# Patient Record
Sex: FEMALE | Race: WHITE | NOT HISPANIC OR LATINO | Employment: PART TIME | URBAN - METROPOLITAN AREA
[De-identification: names, ages, dates, MRNs, and addresses within clinical notes are randomized per-mention and may not be internally consistent; named-entity substitution may affect disease eponyms.]

---

## 2017-02-15 ENCOUNTER — OFFICE VISIT (OUTPATIENT)
Dept: MEDICAL GROUP | Facility: MEDICAL CENTER | Age: 37
End: 2017-02-15
Payer: MEDICARE

## 2017-02-15 VITALS
HEART RATE: 85 BPM | WEIGHT: 171.8 LBS | HEIGHT: 66 IN | DIASTOLIC BLOOD PRESSURE: 74 MMHG | SYSTOLIC BLOOD PRESSURE: 114 MMHG | BODY MASS INDEX: 27.61 KG/M2 | OXYGEN SATURATION: 98 % | TEMPERATURE: 98 F

## 2017-02-15 DIAGNOSIS — Z13.21 ENCOUNTER FOR VITAMIN DEFICIENCY SCREENING: ICD-10-CM

## 2017-02-15 DIAGNOSIS — C43.9 MALIGNANT MELANOMA, UNSPECIFIED SITE (HCC): ICD-10-CM

## 2017-02-15 DIAGNOSIS — F32.9 MAJOR DEPRESSIVE DISORDER WITH SINGLE EPISODE, REMISSION STATUS UNSPECIFIED: ICD-10-CM

## 2017-02-15 DIAGNOSIS — E89.0 POSTOPERATIVE HYPOTHYROIDISM: ICD-10-CM

## 2017-02-15 DIAGNOSIS — I89.0 ACQUIRED LYMPHEDEMA OF LEG: ICD-10-CM

## 2017-02-15 DIAGNOSIS — F41.9 ANXIETY: ICD-10-CM

## 2017-02-15 DIAGNOSIS — Z13.228 SCREENING FOR METABOLIC DISORDER: ICD-10-CM

## 2017-02-15 PROCEDURE — 1036F TOBACCO NON-USER: CPT | Performed by: NURSE PRACTITIONER

## 2017-02-15 PROCEDURE — 99204 OFFICE O/P NEW MOD 45 MIN: CPT | Mod: 25 | Performed by: NURSE PRACTITIONER

## 2017-02-15 PROCEDURE — G8484 FLU IMMUNIZE NO ADMIN: HCPCS | Performed by: NURSE PRACTITIONER

## 2017-02-15 PROCEDURE — G8419 CALC BMI OUT NRM PARAM NOF/U: HCPCS | Performed by: NURSE PRACTITIONER

## 2017-02-15 NOTE — PROGRESS NOTES
Chief Complaint   Patient presents with   • Lymphedema Aquired     in R. thigh, referral to specialist     Vanda Diego is a 36 y.o. female here to establish care and for evaluation and management of:    HPI:    Melanoma (CMS-HCC)  Primary site was right ant thigh-august of 2014  Had removal of regional lymph nodes  Sees Dermatology q 3 months Phil Mak MD  Sees oncologist q 6 months q6 PET scan    Postoperative hypothyroidism  Stable. Currently taking levothyroxine 150 mcg daily as prescribed.  Denies palpitations, skin changes, temperature intolerance, or changes in bowel habits        Anxiety  Chronic and takes 20mg lexapro daily  Would like to go to counseling for additional support    Depression  Depression for many years and started in around 2007  Would like to go to counseling for additional support  She is taking medicine daily as directed. Side effects: Dry mouth.  Mood currently does not affect: work, relationships, social activities.  Denies agoraphobia, panic attacks, SI/HI. (Denies wishing to be dead, thinking about death, intent to commit suicide, previous suicide attempt)     Review Of Systems  No visual or auditory hallucinations. Denies symptoms of darlene: grandiosity, euphoria, need for little or no sleep, rapid pressured speech, spending sprees, reckless or risky behavior, hypersexual behavior.         Current medicines (including changes today)  Current Outpatient Prescriptions   Medication Sig Dispense Refill   • escitalopram (LEXAPRO) 20 MG tablet Take 20 mg by mouth every day.     • levothyroxine (SYNTHROID) 150 MCG Tab Take 150 mcg by mouth Every morning on an empty stomach.       No current facility-administered medications for this visit.     She  has a past medical history of Cancer (CMS-HCC); Anxiety; Hypothyroid; and Depression.  She  has past surgical history that includes thyroidectomy total and wide excision melanoma, leg, w/poss.stsg.  Social History   Substance Use Topics   • Smoking  "status: Former Smoker -- 0.25 packs/day for 13 years   • Smokeless tobacco: Former User   • Alcohol Use: No     Social History     Social History Narrative     Family History   Problem Relation Age of Onset   • Cancer Father      Squamous cell carcinoma   • Cancer Paternal Grandmother      Squamous cell carcinoma   • Cancer Paternal Grandfather      Squamous cell carcinoma     No family status information on file.         ROS  Constitutional: Negative for fever, chills/sweats   Respiratory: Negative for shortness of breath, DEXTER  Cardiovascular: Negative for chest pain or pressure  GI/: Negative for diarrhea/constipation / urinary difficulty  All other systems reviewed and are negative except as per HPI.        Objective:     Blood pressure 114/74, pulse 85, temperature 36.7 °C (98 °F), height 1.676 m (5' 5.98\"), weight 77.928 kg (171 lb 12.8 oz), SpO2 98 %. Body mass index is 27.74 kg/(m^2).  Physical Exam:    Constitutional: Alert, no distress.  Skin: Warm, dry, good turgor, no rashes in visible areas.  Eye: Equal, round and reactive, conjunctiva clear, lids normal.  ENMT: Lips without lesions, good dentition, oropharynx clear.  Neck: Trachea midline, no masses, no thyromegaly.  Respiratory: Unlabored respiratory effort, lungs clear to auscultation, no wheezes, no ronchi.  Cardiovascular: Normal S1, S2, no murmur, no edema.  Abdomen: Soft, non-tender, no masses, no hepatosplenomegaly.  Psych: Alert and oriented x3, normal affect and mood.        Assessment and Plan:   The following treatment plan was discussed   1. Major depressive disorder with single episode, remission status unspecified  REFERRAL TO BEHAVIORAL HEALTH    referral to counseling and continue meds as prescribed-f/u in 3 months for reassessment   2. Malignant melanoma, unspecified site (CMS-HCC)      continue to follow-up with specialist as described   3. Acquired lymphedema of leg  REFERRAL TO OTHER    referral to lymphedema clinic   4. " Postoperative hypothyroidism      chronic and stable. will request lab records. pt states labs done 5 months ago   5. Anxiety      referral to counseling placed. continue to meds as described   6. Encounter for vitamin deficiency screening  VITAMIN D,25 HYDROXY   7. Screening for metabolic disorder  COMP METABOLIC PANEL     Records requested and will request lab records.   Followup: Return in about 3 months (around 5/15/2017). or sooner for new symptoms or should new problems arise        This dictation was created using voice recognition software. The accuracy of the dictation is limited to the abilities of the software. I expect there may be some errors of grammar and possibly content. The MA notes were reviewed and certain aspects of this information were incorporated into this note.

## 2017-02-15 NOTE — ASSESSMENT & PLAN NOTE
Depression for many years and started in around 2007  Would like to go to counseling for additional support  She is taking medicine daily as directed. Side effects: Dry mouth.  Mood currently does not affect: work, relationships, social activities.  Denies agoraphobia, panic attacks, SI/HI. (Denies wishing to be dead, thinking about death, intent to commit suicide, previous suicide attempt)     Review Of Systems  No visual or auditory hallucinations. Denies symptoms of darlene: grandiosity, euphoria, need for little or no sleep, rapid pressured speech, spending sprees, reckless or risky behavior, hypersexual behavior.

## 2017-02-15 NOTE — MR AVS SNAPSHOT
"        Vanda Cortez   2/15/2017 8:00 AM   Office Visit   MRN: [id number]    Department:  [address] [place] [address]   Dept Phone:  [phone]    Description:  Female : 1980   Provider:  MARTA Perla           Reason for Visit     Pain sciatic pain x 6 months    Lymphedema Aquired in R. thigh, referral to specialist      Allergies as of 2/15/2017     Allergen Noted Reactions    Latex 2016   Rash    Morphine 2016   Rash    Sulfa Drugs 2016   Itching    Skin starts to slough of    Sulfamethoxazole-Trimethoprim 2016   Rash      You were diagnosed with     Major depressive disorder with single episode, remission status unspecified   [3325654]       Encounter for vitamin deficiency screening   [115149]       Acquired lymphedema of leg   [986698]       Screening for metabolic disorder   [0914754]         Vital Signs     Blood Pressure Pulse Temperature Height Weight Body Mass Index    114/74 mmHg 85 36.7 °C (98 °F) 1.676 m (5' 5.98\") 77.928 kg (171 lb 12.8 oz) 27.74 kg/m2    Oxygen Saturation Smoking Status                98% Former Smoker          Basic Information     Date Of Birth Sex Race Ethnicity Preferred Language    1980 Female White Non- English      Your appointments     May 16, 2017  3:00 PM   Established Patient with MARTA Perla   [facility] [address]    [address] [facility] [address]  [facility] [address]   [phone]           You will be receiving a confirmation call a few days before your appointment from our automated call confirmation system.              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   2012 - Present    Superficial spreading malignant melanoma of skin (CMS-HCC) C43.9   2014 - Present "    Depression F32.9   10/13/2012 - Present    Pain in eye H57.10   5/24/2016 - Present    Hypothyroidism E03.9   10/12/2012 - Present    Menorrhagia N92.0   11/28/2014 - Present      Health Maintenance        Date Due Completion Dates    IMM DTaP/Tdap/Td Vaccine (1 - Tdap) 9/19/2001 ---    PAP SMEAR 9/19/2003 ---    IMM INFLUENZA (1) 9/1/2016 10/26/2015, 10/11/2013, 10/28/2010            Current Immunizations     Influenza Vaccine Quad Inj (Preserved) 10/26/2015, 10/11/2013, 10/28/2010      Below and/or attached are the medications your provider expects you to take. Review all of your home medications and newly ordered medications with your provider and/or pharmacist. Follow medication instructions as directed by your provider and/or pharmacist. Please keep your medication list with you and share with your provider. Update the information when medications are discontinued, doses are changed, or new medications (including over-the-counter products) are added; and carry medication information at all times in the event of emergency situations     Allergies:  LATEX - Rash     MORPHINE - Rash     SULFA DRUGS - Itching     SULFAMETHOXAZOLE-TRIMETHOPRIM - Rash               Medications  Valid as of: February 15, 2017 -  8:47 AM    Generic Name Brand Name Tablet Size Instructions for use    Escitalopram Oxalate (Tab) LEXAPRO 20 MG Take 20 mg by mouth every day.        Levothyroxine Sodium (Tab) SYNTHROID 150 MCG Take 150 mcg by mouth Every morning on an empty stomach.        .                 Medicines prescribed today were sent to:     [id number] - [place], [place] - [address] [place] [address]    Phone: [phone] Fax: [phone]    Open 24 Hours?: No      Medication refill instructions:       If your prescription bottle indicates you have medication refills left, it is not necessary to call your provider’s office. Please contact your pharmacy and they will refill your medication.      If your prescription bottle indicates you do not have any refills left, you may request refills at any time through one of the following ways: The online [employer] system (except Urgent Care), by calling your provider’s office, or by asking your pharmacy to contact your provider’s office with a refill request. Medication refills are processed only during regular business hours and may not be available until the next business day. Your provider may request additional information or to have a follow-up visit with you prior to refilling your medication.   *Please Note: Medication refills are assigned a new Rx number when refilled electronically. Your pharmacy may indicate that no refills were authorized even though a new prescription for the same medication is available at the pharmacy. Please request the medicine by name with the pharmacy before contacting your provider for a refill.        Your To Do List     Future Labs/Procedures Complete By Expires    COMP METABOLIC PANEL  As directed 2/16/2018    VITAMIN D,25 HYDROXY  As directed 2/16/2018      Referral     A referral request has been sent to our patient care coordination department. Please allow 3-5 business days for us to process this request and contact you either by phone or mail. If you do not hear from us by the 5th business day, please call us at [phone].           [employer] Access Code: Activation code not generated  Current [employer] Status: Active

## 2017-02-15 NOTE — ASSESSMENT & PLAN NOTE
Primary site was right ant thigh-august of 2014  Had removal of regional lymph nodes  Sees Dermatology q 3 months Phil Mak MD  Sees oncologist q 6 months q6 PET scan

## 2017-02-15 NOTE — ASSESSMENT & PLAN NOTE
Stable. Currently taking levothyroxine 150 mcg daily as prescribed.  Denies palpitations, skin changes, temperature intolerance, or changes in bowel habits

## 2017-03-14 ENCOUNTER — NON-PROVIDER VISIT (OUTPATIENT)
Dept: WOUND CARE | Facility: MEDICAL CENTER | Age: 37
End: 2017-03-14
Attending: NURSE PRACTITIONER
Payer: MEDICARE

## 2017-03-14 PROCEDURE — G8978 MOBILITY CURRENT STATUS: HCPCS | Mod: CK

## 2017-03-14 PROCEDURE — G8979 MOBILITY GOAL STATUS: HCPCS | Mod: CK

## 2017-03-14 PROCEDURE — 97140 MANUAL THERAPY 1/> REGIONS: CPT

## 2017-03-14 PROCEDURE — 97162 PT EVAL MOD COMPLEX 30 MIN: CPT

## 2017-03-14 NOTE — CERTIFICATION
Advanced [facility]  [facility]  [address]  [address]  [place] [address]  [phone] Fax: [phone]    Lymphedema Initial Evaluation   For Certification Period: 3/14/17         to    4/14/17       visit  G-code  C-code  kx modifier     #1  3/14/17    no                     Referring Physician:NESHA Perla  Primary Physician:  same  Consulting Physicians:    edema(s): R LE  Start of Care:3/14/17  Date of Surgery:  2014  Dates of chemotherapy or radiation: 10/2014    Subjective:        HPI:Pt is 35 y/o female s/p melanoma resection on R proximal anterior thigh surgically removed at [facility] in Oct 2014.  Pt reports having at least 9 nodes removed then states she had all nodes removed.  Pt developed a seroma on medial thigh after drain removal and was caring for this on her own until it resolved.  Pt then developed R LE lymphedema and was treated in [place] for CDT at [facility].  Pt was able to reduce leg volume, obtain compression pantyhose and a lymphatic pump.  She also had a surgeon in [place] drain the seroma at one time.  Pt has since moved to [place] and is having an exacerbation of R thigh edema and is concerned the seroma is back.  She is very active and is not able to return to sport due to this. She has purchased bandaging supplies and would like to try this but would like direction so she does it correctly.    Pain: none, just discomfort due to thigh rubbing with all ambulatory activities    Past Medical History:   Past Medical History   Diagnosis Date   • Cancer (CMS-HCC)      melanoma - 2014   • Anxiety    • Hypothyroid    • Depression    Current Medications:   Current outpatient prescriptions:   •  escitalopram (LEXAPRO) 20 MG tablet, Take 20 mg by mouth every day., Disp: , Rfl:   •  levothyroxine (SYNTHROID) 150 MCG Tab, Take 150 mcg by mouth Every morning on an empty stomach., Disp: , Rfl:   Past Surgical History:   Past Surgical History   Procedure Laterality Date    • Thyroidectomy total       2008   • Wide excision melanoma, leg, w/poss.stsg       2014     Social History:  Social History     Social History   • Marital Status: Single     Spouse Name: N/A   • Number of Children: N/A   • Years of Education: N/A     Occupational History   • Not on file.     Social History Main Topics   • Smoking status: Former Smoker -- 0.25 packs/day for 13 years   • Smokeless tobacco: Former User   • Alcohol Use: No   • Drug Use: No   • Sexual Activity: Not on file     Other Topics Concern   • Not on file     Social History Narrative        Objective:      Tests, Measures and Referrals:3/14/17:  Palpable pulses DP, PT R LE    Orthotic, protective, supportive devices: Pt has 30-40 mmhg compression tights,  Pt uses a lymphatic pump, but it is not flexitouch, once a day in the morning prior to donning compression garment.  Pt does not sleep with any garment    Fall Risk Assessment (lily all that apply with an X):3/14/17completed- not a fall risk   65 years or older     Fall within the last 2 years, uses   Ambulatory devices  Loss of protective sensation in feet,   Use of prostethic/orthotic, years    Presence of lower extremity/foot/toe amputation   Taking medication that increases risk (per facility policy)    Interventions Recommended (if any of the above are selected):   Use of Assistive Device:________________________   Supervision with ambulation:  Caregiver   Assistance with ambulation:  Caregiver   Home safety education:  Educational material provided    Skin condition:  edema, scars- significant on anterior mid thigh and in inguinal region R , tenderness, , shiny,  hair,sulci,       Edema / Girth measurements:        Lower Extremity Lymphedema Girth Record    Position when measuring:   Standing____   Long-sitting__X__   Supine____      Date: 3/14/17      Location:   R       L Affected _____ Affected _____ Affected _____          L       K       J       I       H 61.8     55.8      G 51         46.8      F 38        37      E 33.8     31.7      D 37.4     35.6      C 27.5     28      B 19.8     19.8      A  (foot measurement) 20.5     20.5        Measurements are in 10cm increments.   Keep the foot / leg relationship as close to 90 degrees as possible.  Measurement A is measured 10cm from base of 3rd toenail.    Measurement B is measured 10cm from the heel up the lateral leg and so on every 10cm.      Procedures performed:  Patient Education: manual therapy 1 - bandaging. 8,10 ,10, 12,   Initial eval-mod- discussed at length current POC, and recurrent issues.  Explained to pt Dr. Schulz will be notified regarding pt concern of seroma- see below-  4-5-17 Dr. GOODE will US in clinic  Manual therapy:  Pt was shown and bandaged R LE from MTH to superior thigh with tricofix, rosidal foam, comprilan 8, 10 cm x 2, 12 cm.  Pt was shown how to do figure 8 at ankle and knee, as well, but instructed that she does not need to do figure 8 the entire limb.  Also she can try just rosidal foam and does not need large gray foam clam shells that came with her kit.  Pt was provided with spaghetti foam to use in compression pantyhose over area of concern of seroma if she would like to try this for local compression.  Pt to try bandaging at NOC post pump, then daytime garment post am pump  Compression pump- pt uses 1x daily currently, would like to increase time daily but uncertain of brand. Pt did say it is not flexitouch as her insurance and copay was cost prohibitive. It is possible that the pump she is using may be contributing to proximal thigh edema if the leg portion does not come up that high.    Education:  Per above and pt currently should not try inc compression solely proximally as this could potentially increase distal edema.   Plan    AM pump, compression pantyhose (spaghetti foam),  PM pump, bandaging.  Will discuss seroma issue with Dr. GOODE- see below 4-5-17 US seroma in clinic per Dr. GOODE     ROM: WNL though pt  feel limited in hip flexion     Gait: WNL    Sensation: intact, with some areas of diminished sensation due to scar tissue     ADL issues:   Pt c/o right thigh rubbing with ambulation, biking, hiking. Pt states she has not been able to ski or snowboard since her surgery in .    Posture: WNL    Strength: WNL    Wounds issues:  None- significant scar tisue  Mid thigh and inguinal    Professional Collaboration:  3/14/17:  Initial eval completed and sent to referring provider via [facility]  Reviewed case with Dr. Schulz regarding seroma.  Originally pt was asked to return to previous MD who have cared for her regarding the seroma but pt did not want to return to them.  Dr. GOODE states we can look at the seroma with our US in clinic upon an appt with her to determine if it is still present, problematic and/or if any intervention would be recommended.  Dr. GOODE will be notified upon appt on 4-5-17 for in clinic US    Assessment:      Edema etiology: secondary lymphedema due to melanoma post resection and node removal  In 2014    Rationale for Treatment: reinstate CDT as she has had a flare up and determine areas to improve maintenance phase so she can prevent flare up    Edema Progress:currently with R thigh flare up with pt concern of recurrence of seroma on medial thigh.  Edema return could also be a result of pump not efficiently clearing entire leg into trunk and fluid could be stagnant above top of sleeve.     Patient tolerance/compliance: Pt emotional and really would like to reduce LE volume so she can return to sport     Complicating factors: questionable seroma on medial thigh- ? Pump efficacy clearing proximal thigh    Need for ongoing Advanced lymphedema services:  Pt. Requires skilled therapuetic management of lymphedema via complete decongestive therapy including compression, exercise, MLD and other therapy.       LTG (4wks) Met (m)    PT/CG will understand contraindications to the continuation of treatment and  appropriate action to take.    PT/CG will be independent with performance of manual lymphatic drainage techniques.    PT/CG will perform exercises to facilitate lymphatic drainage independently.    PT/CG will demonstrate compressive wrapping techniques with min assistance and verbal cues.    Pain will decrease to /10.    PT/CG will be independent with care of compressive wraps.    Girth volume will decrease by %.    PT/CG will be independent with compressive wrapping techniques.    PT/CG will obtain compressive garments.    PT/CG will use compressive wraps and garments on an appropriate schedule.    PT/CG will understand the importance of consistent lymphedema management.    Other (ROM, strength, balance, gait, transfers, ADl's, endurance, wounds, skin, mobility)                 Patient Goals: independent management of edema(s)    History: mod  Exam of body systems:mod   Clinical Presentation:mod   Complexity:mod  Plan:        Dressings / Supply Considerations: pt to increase pump use to 2x/daily. Pt has bandaging supplies and will try to continue with this to bring volume down.  Ultimately she will need to purchase new pantyhose to accommodate volume decrease.       Procedures:      Proper skin care  Exercise for lymphatic return  Education   Compressive wrapping Manual lymphatic drainage  ADL Training   Soft tissue mobilization Wound management   Other    Frequency:  2x/week         Certification Date:    3/14/17  to    4/14/17       At the time of each visit a thorough assessment of the patient is completed to assure appropriateness of our plan of care.  The dressings or modalities may need to be adapted from the original plan to address any significant changes in the environment.    Clinician Signature:______________________________Date:__________________      Physician Signature:_____________________________Date:__________________

## 2017-03-24 ENCOUNTER — NON-PROVIDER VISIT (OUTPATIENT)
Dept: WOUND CARE | Facility: MEDICAL CENTER | Age: 37
End: 2017-03-24
Attending: NURSE PRACTITIONER
Payer: MEDICARE

## 2017-03-24 PROCEDURE — 97535 SELF CARE MNGMENT TRAINING: CPT

## 2017-03-24 PROCEDURE — A6457 TUBULAR DRESSING: HCPCS

## 2017-03-24 PROCEDURE — 305382 HCHG ROLL-SOFT CONFORMING 4"

## 2017-03-24 NOTE — WOUND TEAM
Advanced [facility]  [facility]  [address]  [address]  [place] [address]  [phone] Fax: [phone]    Lymphedema Initial Evaluation   For Certification Period: 3/14/17         to    4/14/17       visit  G-code  C-code  kx modifier     #2  3/24/17    no                     Referring Physician:NESHA Perla  Primary Physician:  same  Consulting Physicians:    edema(s): R LE  Start of Care:3/14/17  Date of Surgery:  2014  Dates of chemotherapy or radiation: 10/2014    Subjective:        SUBJECTIVE:  3-24-17 - pt is frustrated w/ the bandaging as it  slips quickly.  She is compliant w/ her self massage, use of sequential pump 1 x/day along w/ self massage.  She wears the bandaging only at night now.  She must be indep as boyfriend is not helpful.  Pt has Juzo p-hose 30-40 mmHg. She demonstrated the areas of swelling and discomfort at the inner upper thigh x2 w/ multiple nodes removed.  She has a little difficulty w/ use of her R hand from previous injury, but modifies activities as needed.    HPI:Pt is 37 y/o female s/p melanoma resection on R proximal anterior thigh surgically removed at [facility] in Oct 2014.  Pt reports having at least 9 nodes removed then states she had all nodes removed.  Pt developed a seroma on medial thigh after drain removal and was caring for this on her own until it resolved.  Pt then developed R LE lymphedema and was treated in [place] for CDT at [facility].  Pt was able to reduce leg volume, obtain compression pantyhose and a lymphatic pump.  She also had a surgeon in [place] drain the seroma at one time.  Pt has since moved to [place] and is having an exacerbation of R thigh edema and is concerned the seroma is back.  She is very active and is not able to return to sport due to this. She has purchased bandaging supplies and would like to try this but would like direction so she does it correctly.    Pain: none, just discomfort due to thigh rubbing with all  ambulatory activities    Past Medical History:   Past Medical History   Diagnosis Date   • Cancer (CMS-HCC)      melanoma - 2014   • Anxiety    • Hypothyroid    • Depression    Current Medications:   Current outpatient prescriptions:   •  escitalopram (LEXAPRO) 20 MG tablet, Take 20 mg by mouth every day., Disp: , Rfl:   •  levothyroxine (SYNTHROID) 150 MCG Tab, Take 150 mcg by mouth Every morning on an empty stomach., Disp: , Rfl:   Past Surgical History:   Past Surgical History   Procedure Laterality Date   • Thyroidectomy total       2008   • Wide excision melanoma, leg, w/poss.stsg       2014     Social History:  Social History     Social History   • Marital Status: Single     Spouse Name: N/A   • Number of Children: N/A   • Years of Education: N/A     Occupational History   • Not on file.     Social History Main Topics   • Smoking status: Former Smoker -- 0.25 packs/day for 13 years   • Smokeless tobacco: Former User   • Alcohol Use: No   • Drug Use: No   • Sexual Activity: Not on file     Other Topics Concern   • Not on file     Social History Narrative        Objective:      Tests, Measures and Referrals:3/14/17:  Palpable pulses DP, PT R LE    Orthotic, protective, supportive devices:   3-24-17 - discussion re: use of p-hose 1-2 layers, vs bandaging w/ modification.  Suggested bike shorts to maintain position of bandaging.  3-14-17 Pt has 30-40 mmhg compression tights,  Pt uses a lymphatic pump, but it is not flexitouch, once a day in the morning prior to donning compression garment.  Pt does not sleep with any garment    Fall Risk Assessment (lily all that apply with an X):3/14/17completed- not a fall risk     Skin condition:    edema, scars- significant on anterior mid thigh and in inguinal region R , tenderness, , shiny,  hair,sulci,       Edema / Girth measurements:        Lower Extremity Lymphedema Girth Record    Position when measuring:   Standing____   Long-sitting__X__   Supine____      Date:  3/14/17 3-24-17     Location:   R       L R Affected _____ Affected _____          L       K       J       I  66.8     H 61.8     55.8 65.6     G 51        46.8 53.1     F 38        37 40.5     E 33.8     31.7 31     D 37.4     35.6 27.4     C 27.5     28 29.2     B 19.8     19.8 19.8     A  (foot measurement) 20.5     20.5 20.2       Measurements are in 10cm increments.   Keep the foot / leg relationship as close to 90 degrees as possible.  Measurement A is measured 10cm from base of 3rd toenail.    Measurement B is measured 10cm from the heel up the lateral leg and so on every 10cm.      Procedures performed:  Patient Education:  3-24-17 -   Functional therapy 60 minutes - began w/ MLD R thigh and pelvis by therapist and demonstrated by pt.  Discussed during that time the followup treatments and pt goals.  Final decisions - continue w/ self massage and use of pump daily.  She wants to stay very active w/ exercises and yoga during the summer.  She wants additional info re: compression w/ bandaging but is very worried re: slippage and discomfort at R thigh.  Suggested that she receive more frequent treatments to change bandaging and compression.  Pt states that she is taking a trip for the summer plus it will be very hot w/ bandages on.  PT suggested that she plan to wrap each morning, and be prepared to change the bandaging at least at the upper thigh, 2-3 x/day until sufficient compression is available.  Modified the bandage wrap technique w/ the use of supine position on bed to wrap lower leg and foot w/ stockinette, rosidal foam, then comprilan 8x2, 10, 12 over knee in figure eight only over knee.  Then pt stood and began w/ stockinette overlapped lower leg, rosidal foam to inguinals and all scars and lymphedematous areas, comprilan 10 and 12 cm x2 each.  Tubigrip G over total leg after testing firmness to match total leg.  Discussed need to change the bandaging during the day if it slips down into the posterior  knee.  We also discussed pt participating in her desired activities when she leaves in 2 weeks, returns, and should continue w/ treatments for the cooler weather.    3-14-17 manual therapy 1 - bandaging. 8,10 ,10, 12,   Initial eval-mod- discussed at length current POC, and recurrent issues.  Explained to pt Dr. Schulz will be notified regarding pt concern of seroma- see below-  4-5-17 Dr. GOODE will US in clinic  Manual therapy:  Pt was shown and bandaged R LE from MTH to superior thigh with tricofix, rosidal foam, comprilan 8, 10 cm x 2, 12 cm.  Pt was shown how to do figure 8 at ankle and knee, as well, but instructed that she does not need to do figure 8 the entire limb.  Also she can try just rosidal foam and does not need large gray foam clam shells that came with her kit.  Pt was provided with spaghetti foam to use in compression pantyhose over area of concern of seroma if she would like to try this for local compression.  Pt to try bandaging at NOC post pump, then daytime garment post am pump  Compression pump- pt uses 1x daily currently, would like to increase time daily but uncertain of brand. Pt did say it is not flexitouch as her insurance and copay was cost prohibitive. It is possible that the pump she is using may be contributing to proximal thigh edema if the leg portion does not come up that high.    Education:  Per above and pt currently should not try inc compression solely proximally as this could potentially increase distal edema.   Plan    AM pump, compression pantyhose (spaghetti foam),  PM pump, bandaging.  Will discuss seroma issue with Dr. GOODE- see below 4-5-17 US seroma in clinic per Dr. GOODE     ROM: WNL though pt feel limited in hip flexion     Gait:  3-24-17 - today noticed limp w/ full bandaging on R LE  3-14-17 WNL    Sensation: intact, with some areas of diminished sensation due to scar tissue     ADL issues:   Pt c/o right thigh rubbing with ambulation, biking, hiking. Pt states she has not  been able to ski or snowboard since her surgery in .    Posture: WNL    Strength: WNL    Wounds issues:  None- significant scar tisue  Mid thigh and inguinal    Professional Collaboration:    3/14/17:  Initial eval completed and sent to referring provider via [employer]  Reviewed case with Dr. Schulz regarding seroma.  Originally pt was asked to return to previous MD who have cared for her regarding the seroma but pt did not want to return to them.  Dr. GOODE states we can look at the seroma with our US in clinic upon an appt with her to determine if it is still present, problematic and/or if any intervention would be recommended.  Dr. GOODE will be notified upon appt on 4-5-17 for in clinic US    Assessment:      Edema etiology: secondary lymphedema due to melanoma post resection and over 30 nodes removal  In 2014    Rationale for Treatment: reinstate CDT as she has had a flare up and determine areas to improve maintenance phase so she can prevent flare up    Edema Progress:  3-24-17 - slight decrease in circumference R LE - possible measurement differences.  3-14-17currently with R thigh flare up with pt concern of recurrence of seroma on medial thigh.  Edema return could also be a result of pump not efficiently clearing entire leg into trunk and fluid could be stagnant above top of sleeve.     Patient tolerance/compliance:   3-24-17 - continues to be anxious re: all aspects of treatment but left w/ slightly elevated emotions   3-14-17Pt emotional and really would like to reduce LE volume so she can return to sport     Complicating factors: questionable seroma on medial thigh- ? Pump efficacy clearing proximal thigh    Need for ongoing Advanced lymphedema services:  Pt. Requires skilled therapuetic management of lymphedema via complete decongestive therapy including compression, exercise, MLD and other therapy.       LTG (4wks) Met (m)    PT/CG will be independent with performance of manual lymphatic drainage  techniques. m   PT/CG will perform exercises to facilitate lymphatic drainage independently. m   PT/CG will demonstrate compressive wrapping techniques with min assistance and verbal cues. learning   Pain will decrease to 0 /10.    PT/CG will be independent with care of compressive wraps. m   Girth volume will decrease by 3-4 cm    PT/CG will obtain proper compressive garments.    PT/CG will use compressive wraps and garments on an appropriate schedule.    PT/CG will understand the importance of consistent lymphedema management.    ROM - increase R hip flexion   endurance - able to perform any activities desired                   Patient Goals: independent management of edema(s)    History: mod  Exam of body systems:mod   Clinical Presentation:mod   Complexity:mod  Plan:        Dressings / Supply Considerations: pt to increase pump use to 2x/daily. Pt has bandaging supplies and will try to continue with this to bring volume down.  Ultimately she will need to purchase new pantyhose to accommodate volume decrease.       Procedures:      Proper skin care  Exercise for lymphatic return  Education   Compressive wrapping Manual lymphatic drainage  ADL Training   Soft tissue mobilization Wound management   Other    Frequency:  2x/week when able w/ scheduling - pt will be vacationing in several weeks, then plans to return w/ new rx for more intense treatment here.        Certification Date:    3/14/17  to    4/14/17       At the time of each visit a thorough assessment of the patient is completed to assure appropriateness of our plan of care.  The dressings or modalities may need to be adapted from the original plan to address any significant changes in the environment.    Clinician Signature:______________________________Date:__________________      Physician Signature:_____________________________Date:__________________

## 2017-04-05 ENCOUNTER — NON-PROVIDER VISIT (OUTPATIENT)
Dept: WOUND CARE | Facility: MEDICAL CENTER | Age: 37
End: 2017-04-05
Attending: NURSE PRACTITIONER
Payer: MEDICARE

## 2017-04-05 PROCEDURE — 97535 SELF CARE MNGMENT TRAINING: CPT

## 2017-04-05 NOTE — WOUND TEAM
Advanced Wound Care  [facility]  [address]  [address]  [place] [address]  [phone] Fax: [phone]    Lymphedema Encounter Note  For Certification Period: 3/14/17         to    4/14/17       visit  G-code  C-code  kx modifier     #3  4/5/17  8990/8991 CN no                     Referring Physician:NESHA Perla  Primary Physician:  same  Consulting Physicians:    edema(s): R LE  Start of Care:3/14/17  Date of Surgery:  2014  Dates of chemotherapy or radiation: 10/2014    Subjective:        HPI:Pt is 37 y/o female s/p melanoma resection on R proximal anterior thigh surgically removed at [facility] in Oct 2014.  Pt reports having at least 9 nodes removed then states she had all nodes removed.  Pt developed a seroma on medial thigh after drain removal and was caring for this on her own until it resolved.  Pt then developed R LE lymphedema and was treated in [place] for CDT at [facility].  Pt was able to reduce leg volume, obtain compression pantyhose and a lymphatic pump.  She also had a surgeon in [place] drain the seroma at one time.  Pt has since moved to [place] and is having an exacerbation of R thigh edema and is concerned the seroma is back.  She is very active and is not able to return to sport due to this. She has purchased bandaging supplies and would like to try this but would like direction so she does it correctly.    Pain: none, just discomfort due to thigh rubbing with all ambulatory activities    Past Medical History:   Past Medical History   Diagnosis Date   • Cancer (CMS-HCC)      melanoma - 2014   • Anxiety    • Hypothyroid    • Depression    Current Medications:   Current outpatient prescriptions:   •  escitalopram (LEXAPRO) 20 MG tablet, Take 20 mg by mouth every day., Disp: , Rfl:   •  levothyroxine (SYNTHROID) 150 MCG Tab, Take 150 mcg by mouth Every morning on an empty stomach., Disp: , Rfl:   Past Surgical History:   Past Surgical History   Procedure Laterality  Date   • Thyroidectomy total       2008   • Wide excision melanoma, leg, w/poss.stsg       2014     Social History:  Social History     Social History   • Marital Status: Single     Spouse Name: N/A   • Number of Children: N/A   • Years of Education: N/A     Occupational History   • Not on file.     Social History Main Topics   • Smoking status: Former Smoker -- 0.25 packs/day for 13 years   • Smokeless tobacco: Former User   • Alcohol Use: No   • Drug Use: No   • Sexual Activity: Not on file     Other Topics Concern   • Not on file     Social History Narrative        Objective:      Tests, Measures and Referrals:3/14/17:  Palpable pulses DP, PT R LE    Orthotic, protective, supportive devices:   3-24-17 - discussion re: use of p-hose 1-2 layers, vs bandaging w/ modification.  Suggested bike shorts to maintain position of bandaging.  3-14-17 Pt has 30-40 mmhg compression tights,  Pt uses a lymphatic pump, but it is not flexitouch, once a day in the morning prior to donning compression garment.  Pt does not sleep with any garment    Fall Risk Assessment (lily all that apply with an X):3/14/17completed- not a fall risk    Skin condition:    edema, scars- significant on anterior mid thigh and in inguinal region R , tenderness, , shiny,  hair,sulci,       Edema / Girth measurements:        Lower Extremity Lymphedema Girth Record    Position when measuring:   Standing____   Long-sitting__X__   Supine____      Date: 3/14/17 3-24-17 4/5/17    Location:   R       L R Affected _____ Affected _____          L       K       J       I  66.8 67    H 61.8     55.8 65.6 63    G 51        46.8 53.1 52.5    F 38        37 40.5 37.5    E 33.8     31.7 31 33    D 37.4     35.6 27.4 38    C 27.5     28 29.2 29.5    B 19.8     19.8 19.8 19.8    A  (foot measurement) 20.5     20.5 20.2 20.5      Measurements are in 10cm increments.   Keep the foot / leg relationship as close to 90 degrees as possible.  Measurement A is measured 10cm from  base of 3rd toenail.    Measurement B is measured 10cm from the heel up the lateral leg and so on every 10cm.      Procedures performed:  Patient Education:  4/5/17:    Functional therapy 4 units  Measurements,  Discussion of bandaging and limitations with falling down, difficulty donning due to hand disability, and bunching causing foot edema when she woke one morning- took 3 days to bring down so now bandaging is not a viable option for her  Seroma- question regarding if it returned upon initial evaluation.  Dr. Schulz in for modified bedside US to determine there is no current seroma.  Pt was offered more formal test but due to insignificant findings today this was not pursued.  Garments:  Discussed option of circaid thigh component with LE bandaging or compression stocking or full leg system. Pt would like to see if insurance can cover this.  It was discussed this likely is not covered but pt was provided with script to [facility] per Dr. RUPA MAGANA tape:  Trial of K tap from R lateral hip to medial mid thigh and another to distal mid thigh incision .  Pt provided with 2 extra pieces and info to order if she deems this is a beneficial therapy for her.  Future:  Continue MLD to determine if this can assist in reduction.  Limited time due to physician intervention and time slot for appt.     3-24-17 -   Functional therapy 60 minutes - began w/ MLD R thigh and pelvis by therapist and demonstrated by pt.  Discussed during that time the followup treatments and pt goals.  Final decisions - continue w/ self massage and use of pump daily.  She wants to stay very active w/ exercises and yoga during the summer.  She wants additional info re: compression w/ bandaging but is very worried re: slippage and discomfort at R thigh.  Suggested that she receive more frequent treatments to change bandaging and compression.  Pt states that she is taking a trip for the summer plus it will be very hot w/ bandages on.  PT suggested that  she plan to wrap each morning, and be prepared to change the bandaging at least at the upper thigh, 2-3 x/day until sufficient compression is available.  Modified the bandage wrap technique w/ the use of supine position on bed to wrap lower leg and foot w/ stockinette, rosidal foam, then comprilan 8x2, 10, 12 over knee in figure eight only over knee.  Then pt stood and began w/ stockinette overlapped lower leg, rosidal foam to inguinals and all scars and lymphedematous areas, comprilan 10 and 12 cm x2 each.  Tubigrip G over total leg after testing firmness to match total leg.  Discussed need to change the bandaging during the day if it slips down into the posterior knee.  We also discussed pt participating in her desired activities when she leaves in 2 weeks, returns, and should continue w/ treatments for the cooler weather.    3-14-17 manual therapy 1 - bandaging. 8,10 ,10, 12,   Initial eval-mod- discussed at length current POC, and recurrent issues.  Explained to pt Dr. Schulz will be notified regarding pt concern of seroma- see below-  4-5-17 Dr. GOODE will US in clinic  Manual therapy:  Pt was shown and bandaged R LE from MTH to superior thigh with tricofix, rosidal foam, comprilan 8, 10 cm x 2, 12 cm.  Pt was shown how to do figure 8 at ankle and knee, as well, but instructed that she does not need to do figure 8 the entire limb.  Also she can try just rosidal foam and does not need large gray foam clam shells that came with her kit.  Pt was provided with spaghetti foam to use in compression pantyhose over area of concern of seroma if she would like to try this for local compression.  Pt to try bandaging at NOC post pump, then daytime garment post am pump  Compression pump- pt uses 1x daily currently, would like to increase time daily but uncertain of brand. Pt did say it is not flexitouch as her insurance and copay was cost prohibitive. It is possible that the pump she is using may be contributing to proximal  thigh edema if the leg portion does not come up that high.    Education:  Per above and pt currently should not try inc compression solely proximally as this could potentially increase distal edema.   Plan    AM pump, compression pantyhose (spaghetti foam),  PM pump, bandaging.  Will discuss seroma issue with Dr. GOODE- see below 4-5-17 US seroma in clinic per Dr. GOODE     ROM: WNL though pt feel limited in hip flexion     Gait:  3-24-17 - today noticed limp w/ full bandaging on R LE  3-14-17 WNL    Sensation: intact, with some areas of diminished sensation due to scar tissue     ADL issues:   Pt c/o right thigh rubbing with ambulation, biking, hiking. Pt states she has not been able to ski or snowboard since her surgery in .    Posture: WNL    Strength: WNL    Wounds issues:  None- significant scar tisue  Mid thigh and inguinal    Professional Collaboration:   4/5/17:  Dr. GOODE in to assist with seroma US.  No seroma noted today, full assessment not order due to pt able to visualize and verbalize no evidence of residual seroma   3/14/17:  Initial eval completed and sent to referring provider via [facility]  Reviewed case with Dr. Schulz regarding seroma.  Originally pt was asked to return to previous MD who have cared for her regarding the seroma but pt did not want to return to them.  Dr. GOODE states we can look at the seroma with our US in clinic upon an appt with her to determine if it is still present, problematic and/or if any intervention would be recommended.  Dr. GOODE will be notified upon appt on 4-5-17 for in clinic US    Assessment:      Edema etiology: secondary lymphedema due to melanoma post resection and over 30 nodes removal  In 2014    Rationale for Treatment: reinstate CDT as she has had a flare up and determine areas to improve maintenance phase so she can prevent flare up    Edema Progress:  4-5-17:  No seroma noted, measurements stable despite LE edema with bandaging issues.   3-24-17 - slight decrease in  circumference R LE - possible measurement differences.  3-14-17currently with R thigh flare up with pt concern of recurrence of seroma on medial thigh.  Edema return could also be a result of pump not efficiently clearing entire leg into trunk and fluid could be stagnant above top of sleeve.     Patient tolerance/compliance:   4-5-17- still frustrated with thigh edema but beginning to understand she may not be able to return to L LE volume.  3-24-17 - continues to be anxious re: all aspects of treatment but left w/ slightly elevated emotions   3-14-17Pt emotional and really would like to reduce LE volume so she can return to sport     Complicating factors: questionable seroma on medial thigh- ? Pump efficacy clearing proximal thigh    Need for ongoing Advanced lymphedema services:  Pt. Requires skilled therapuetic management of lymphedema via complete decongestive therapy including compression, exercise, MLD and other therapy.       LTG (4wks) Met (m)    PT/CG will be independent with performance of manual lymphatic drainage techniques. m   PT/CG will perform exercises to facilitate lymphatic drainage independently. m   PT/CG will demonstrate compressive wrapping techniques with min assistance and verbal cues. learning   Pain will decrease to 0 /10.    PT/CG will be independent with care of compressive wraps. m   Girth volume will decrease by 3-4 cm    PT/CG will obtain proper compressive garments.    PT/CG will use compressive wraps and garments on an appropriate schedule.    PT/CG will understand the importance of consistent lymphedema management.    ROM - increase R hip flexion   endurance - able to perform any activities desired                   Patient Goals: independent management of edema(s)    History: mod  Exam of body systems:mod   Clinical Presentation:mod   Complexity:mod  Plan:        Dressings / Supply Considerations: pt to increase pump use to 2x/daily. Pt has bandaging supplies and will try to  continue with this to bring volume down.  Ultimately she will need to purchase new pantyhose to accommodate volume decrease.       Procedures:      Proper skin care  Exercise for lymphatic return  Education   Compressive wrapping Manual lymphatic drainage  ADL Training   Soft tissue mobilization Wound management   Other    Frequency:  2x/week when able w/ scheduling - pt will be vacationing in several weeks, then plans to return w/ new rx for more intense treatment here.        Certification Date:    3/14/17  to    4/14/17       At the time of each visit a thorough assessment of the patient is completed to assure appropriateness of our plan of care.  The dressings or modalities may need to be adapted from the original plan to address any significant changes in the environment.    Clinician Signature:______________________________Date:__________________      Physician Signature:_____________________________Date:__________________

## 2017-04-06 NOTE — CONSULTS
"DATE OF SERVICE:  04/05/2017    REASON FOR CONSULTATION:  Possible seroma, right thigh.    HISTORY OF PRESENT ILLNESS:  This is a 36-year-old woman who underwent   resection of a right anterior thigh melanoma in October of 2014.  She reports   a positive sentinel lymph node with\"50 cells positive.\"  Following the   sentinel lymph node biopsy, she had multiple lymph nodes removed from the   right groin.  She developed a seroma in the medial thigh and cared for this on   her own with probing of a skin tract until drainage resolved.  She then   developed lymphedema and was treated in [place] with reduction of volume.  She   has obtained compression pantyhose and a lymphatic pump.  Apparently, there   was drainage of the postoperative seroma as well.    The patient has moved to [place] and is having exacerbation of swelling in the   thigh and is very concerned her seroma has recurred.    PAST MEDICAL HISTORY:  1.  Melanoma.  2.  Right leg lymphadenopathy.  3.  Anxiety.  4.  Hypothyroidism.  5.  Depression.  6.  Thyroid cancer.    MEDICATIONS:  Lexapro 20 mg a day, Synthroid 150 mcg a day.    PAST SURGICAL HISTORY:  1.  Total thyroidectomy in 2008.  2.  Wide excision of right leg melanoma.    SOCIAL HISTORY:  She is single, has no children and has quit smoking years   ago.  She denies any alcohol or drug abuse.    FAMILY HISTORY:  Noncontributory.    PHYSICAL EXAMINATION:  GENERAL:  This is a young female in no apparent distress.  MUSCULOSKELETAL:  She has focal edema in the right thigh with well-healed   wounds in the right groin and anterior thigh.    IMAGING:  I performed bedside ultrasound examination and confirmed scar within   the right thigh.  There is no evidence of drainage from the right thigh and   on ultrasound, I cannot see any evidence of recurrent fluid collections within   the anterior or medial thigh.    IMPRESSION:  I offered a formal ultrasound, explained to her that the current   device I am using which " is a SonoSite ultrasound probe is inadequate for   formal readings.  Although I do not see a cavity at all, it is certainly   possible that something could be identified with further more distinct   imaging.  With an overall negative result, reviewed by her as well, she   prefers to continue with conservative care and not sign on for any further or   future imaging studies at present.  Compression will be advised by lymphedema   therapist today.       ____________________________________     MD PADILLA Mendoza / JABIER    DD:  04/05/2017 22:34:05  DT:  04/05/2017 23:35:35    D#:  [id number]  Job#:  [id number]

## 2017-04-12 ENCOUNTER — APPOINTMENT (OUTPATIENT)
Dept: WOUND CARE | Facility: MEDICAL CENTER | Age: 37
End: 2017-04-12
Payer: MEDICARE

## 2017-04-20 ENCOUNTER — HOSPITAL ENCOUNTER (EMERGENCY)
Facility: MEDICAL CENTER | Age: 37
End: 2017-04-20
Attending: EMERGENCY MEDICINE
Payer: MEDICARE

## 2017-04-20 VITALS
WEIGHT: 170.19 LBS | RESPIRATION RATE: 18 BRPM | DIASTOLIC BLOOD PRESSURE: 76 MMHG | BODY MASS INDEX: 28.36 KG/M2 | OXYGEN SATURATION: 98 % | HEART RATE: 88 BPM | SYSTOLIC BLOOD PRESSURE: 118 MMHG | HEIGHT: 65 IN | TEMPERATURE: 98.8 F

## 2017-04-20 DIAGNOSIS — J02.9 VIRAL PHARYNGITIS: ICD-10-CM

## 2017-04-20 LAB
DEPRECATED S PYO AG THROAT QL EIA: NORMAL
SIGNIFICANT IND 70042: NORMAL
SITE SITE: NORMAL
SOURCE SOURCE: NORMAL

## 2017-04-20 PROCEDURE — 700111 HCHG RX REV CODE 636 W/ 250 OVERRIDE (IP): Performed by: EMERGENCY MEDICINE

## 2017-04-20 PROCEDURE — 87081 CULTURE SCREEN ONLY: CPT

## 2017-04-20 PROCEDURE — 99284 EMERGENCY DEPT VISIT MOD MDM: CPT

## 2017-04-20 PROCEDURE — 87880 STREP A ASSAY W/OPTIC: CPT

## 2017-04-20 PROCEDURE — 96372 THER/PROPH/DIAG INJ SC/IM: CPT

## 2017-04-20 RX ORDER — DEXAMETHASONE SODIUM PHOSPHATE 10 MG/ML
10 INJECTION, SOLUTION INTRAMUSCULAR; INTRAVENOUS ONCE
Status: COMPLETED | OUTPATIENT
Start: 2017-04-20 | End: 2017-04-20

## 2017-04-20 RX ORDER — KETOROLAC TROMETHAMINE 30 MG/ML
30 INJECTION, SOLUTION INTRAMUSCULAR; INTRAVENOUS ONCE
Status: COMPLETED | OUTPATIENT
Start: 2017-04-20 | End: 2017-04-20

## 2017-04-20 RX ADMIN — KETOROLAC TROMETHAMINE 30 MG: 30 INJECTION, SOLUTION INTRAMUSCULAR; INTRAVENOUS at 22:45

## 2017-04-20 RX ADMIN — DEXAMETHASONE SODIUM PHOSPHATE 10 MG: 10 INJECTION, SOLUTION INTRAMUSCULAR; INTRAVENOUS at 22:45

## 2017-04-20 ASSESSMENT — PAIN SCALES - GENERAL
PAINLEVEL_OUTOF10: 6
PAINLEVEL_OUTOF10: 6

## 2017-04-20 NOTE — ED AVS SNAPSHOT
[employer] Access Code: Activation code not generated  Current [employer] Status: Active    Integrated biometricshart  A secure, online tool to manage your health information     [employer]’s [employer]® is a secure, online tool that connects you to your personalized health information from the privacy of your home -- day or night - making it very easy for you to manage your healthcare. Once the activation process is completed, you can even access your medical information using the [employer] romain, which is available for free in the Apple Romain store or Google Play store.     [employer] provides the following levels of access (as shown below):   My Chart Features   [facility] Primary Care Doctor [facility]  Specialists [facility]  Urgent  Care Non-[facility]  Primary Care  Doctor   Email your healthcare team securely and privately 24/7 X X X X   Manage appointments: schedule your next appointment; view details of past/upcoming appointments X      Request prescription refills. X      View recent personal medical records, including lab and immunizations X X X X   View health record, including health history, allergies, medications X X X X   Read reports about your outpatient visits, procedures, consult and ER notes X X X X   See your discharge summary, which is a recap of your hospital and/or ER visit that includes your diagnosis, lab results, and care plan. X X       How to register for [employer]:  1. Go to  [email or web address].  2. Click on the Sign Up Now box, which takes you to the New Member Sign Up page. You will need to provide the following information:  a. Enter your [employer] Access Code exactly as it appears at the top of this page. (You will not need to use this code after you’ve completed the sign-up process. If you do not sign up before the expiration date, you must request a new code.)   b. Enter your date of birth.   c. Enter your home email address.   d. Click Submit, and follow the next screen’s instructions.  3. Create a [employer] ID. This will  be your [employer] login ID and cannot be changed, so think of one that is secure and easy to remember.  4. Create a [employer] password. You can change your password at any time.  5. Enter your Password Reset Question and Answer. This can be used at a later time if you forget your password.   6. Enter your e-mail address. This allows you to receive e-mail notifications when new information is available in [employer].  7. Click Sign Up. You can now view your health information.    For assistance activating your [employer] account, call [phone]

## 2017-04-20 NOTE — ED AVS SNAPSHOT
4/20/2017    Vanda Cortez  [address] [place] [address]    Dear Vanda:    [facility] wants to ensure your discharge home is safe and you or your loved ones have had all of your questions answered regarding your care after you leave the hospital.    Below is a list of resources and contact information should you have any questions regarding your hospital stay, follow-up instructions, or active medical symptoms.    Questions or Concerns Regarding… Contact   Medical Questions Related to Your Discharge  (7 days a week, 8am-5pm) Contact a Nurse Care Coordinator   [phone]   Medical Questions Not Related to Your Discharge  (24 hours a day / 7 days a week)  Contact the Nurse Health Line   [phone]    Medications or Discharge Instructions Refer to your discharge packet   or contact your [facility] Primary Care Provider   [phone]   Follow-up Appointment(s) Schedule your appointment via [employer]   or contact Scheduling [phone]   Billing Review your statement via [employer]  or contact Billing [phone]   Medical Records Review your records via [employer]   or contact Medical Records [phone]     You may receive a telephone call within two days of discharge. This call is to make certain you understand your discharge instructions and have the opportunity to have any questions answered. You can also easily access your medical information, test results and upcoming appointments via the [employer] free online health management tool. You can learn more and sign up at [employer]. For assistance setting up your [employer] account, please call [phone].    Once again, we want to ensure your discharge home is safe and that you have a clear understanding of any next steps in your care. If you have any questions or concerns, please do not hesitate to contact us, we are here for you. Thank you for choosing [facility] for your healthcare needs.    Sincerely,    Your [facility] Healthcare Team

## 2017-04-20 NOTE — ED AVS SNAPSHOT
Home Care Instructions                                                                                                                Vanda Cortez   MRN: [id number]    Department:  [facility], Emergency Dept   Date of Visit:  4/20/2017            [facility], Emergency Dept    [address] [address]    Phone:  [phone]      You were seen by     Parker Yoo M.D.      Your Diagnosis Was     Viral pharyngitis     J02.9       These are the medications you received during your hospitalization from 04/20/2017 2038 to 04/20/2017 2247     Date/Time Order Dose Route Action    04/20/2017 2245 dexamethasone pf (DECADRON) injection 10 mg 10 mg Oral Given    04/20/2017 2245 ketorolac (TORADOL) injection 30 mg Intramuscular Given      Follow-up Information     1. Follow up with MARTA Perla In 2 days.    Specialty:  Family Medicine    Why:  if symptoms persist    Contact information    [address]  [address]  [facility] [address] [phone]          2. Follow up with [facility], Emergency Dept.    Specialty:  Emergency Medicine    Why:  If symptoms worsen    Contact information    [address] [facility] [address] [phone]      Medication Information     Review all of your home medications and newly ordered medications with your primary doctor and/or pharmacist as soon as possible. Follow medication instructions as directed by your doctor and/or pharmacist.     Please keep your complete medication list with you and share with your physician. Update the information when medications are discontinued, doses are changed, or new medications (including over-the-counter products) are added; and carry medication information at all times in the event of emergency situations.               Medication List      ASK your doctor about these medications        Instructions    Morning Afternoon Evening Bedtime    levothyroxine 150 MCG Tabs      Commonly known as:  SYNTHROID        Take 150 mcg by mouth Every morning on an empty stomach.   Dose:  150 mcg                        LEXAPRO 20 MG tablet   Generic drug:  escitalopram        Take 20 mg by mouth every day.   Dose:  20 mg                                Procedures and tests performed during your visit     RAPID STREP, CULT IF INDICATED (CULTURE IF NEGATIVE)        Discharge Instructions       Sore Throat  A sore throat is pain, burning, irritation, or scratchiness of the throat. There is often pain or tenderness when swallowing or talking. A sore throat may be accompanied by other symptoms, such as coughing, sneezing, fever, and swollen neck glands. A sore throat is often the first sign of another sickness, such as a cold, flu, strep throat, or mononucleosis (commonly known as mono). Most sore throats go away without medical treatment.  CAUSES   The most common causes of a sore throat include:  · A viral infection, such as a cold, flu, or mono.  · A bacterial infection, such as strep throat, tonsillitis, or whooping cough.  · Seasonal allergies.  · Dryness in the air.  · Irritants, such as smoke or pollution.  · Gastroesophageal reflux disease (GERD).  HOME CARE INSTRUCTIONS   · Only take over-the-counter medicines as directed by your caregiver.  · Drink enough fluids to keep your urine clear or pale yellow.  · Rest as needed.  · Try using throat sprays, lozenges, or sucking on hard candy to ease any pain (if older than 4 years or as directed).  · Sip warm liquids, such as broth, herbal tea, or warm water with honey to relieve pain temporarily. You may also eat or drink cold or frozen liquids such as frozen ice pops.  · Gargle with salt water (mix 1 tsp salt with 8 oz of water).  · Do not smoke and avoid secondhand smoke.  · Put a cool-mist humidifier in your bedroom at night to moisten the air. You can also turn on a hot shower and sit in the bathroom with the door closed for 5-10 minutes.  SEEK  IMMEDIATE MEDICAL CARE IF:  · You have difficulty breathing.  · You are unable to swallow fluids, soft foods, or your saliva.  · You have increased swelling in the throat.  · Your sore throat does not get better in 7 days.  · You have nausea and vomiting.  · You have a fever or persistent symptoms for more than 2-3 days.  · You have a fever and your symptoms suddenly get worse.  MAKE SURE YOU:   · Understand these instructions.  · Will watch your condition.  · Will get help right away if you are not doing well or get worse.     This information is not intended to replace advice given to you by your health care provider. Make sure you discuss any questions you have with your health care provider.     Document Released: 01/25/2006 Document Revised: 01/08/2016 Document Reviewed: 08/25/2013  [employer] Interactive Patient Education ©2016 [employer]            Patient Information     Patient Information    Following emergency treatment: all patient requiring follow-up care must return either to a private physician or a clinic if your condition worsens before you are able to obtain further medical attention, please return to the emergency room.     Billing Information    At [facility], we work to make the billing process streamlined for our patients.  Our Representatives are here to answer any questions you may have regarding your hospital bill.  If you have insurance coverage and have supplied your insurance information to us, we will submit a claim to your insurer on your behalf.  Should you have any questions regarding your bill, we can be reached online or by phone as follows:  Online: You are able pay your bills online or live chat with our representatives about any billing questions you may have. We are here to help Monday - Friday from 8:00am to 7:30pm and 9:00am - 12:00pm on Saturdays.  Please visit [email or web address]  for more information.   Phone:  [phone] or  [phone]    Please note that your emergency physician, surgeon, pathologist, radiologist, anesthesiologist, and other specialists are not employed by [facility] and will therefore bill separately for their services.  Please contact them directly for any questions concerning their bills at the numbers below:     Emergency Physician Services:  [phone]  [facility]:  [phone]  [facility]:  [phone]  [facility]:  [phone]    1. Your final bill may vary from the amount quoted upon discharge if all procedures are not complete at that time, or if your doctor has additional procedures of which we are not aware. You will receive an additional bill if you return to the Emergency Department at [facility] for suture removal regardless of the facility of which the sutures were placed.     2. Please arrange for settlement of this account at the emergency registration.    3. All self-pay accounts are due in full at the time of treatment.  If you are unable to meet this obligation then payment is expected within 4-5 days.     4. If you have had radiology studies (CT, X-ray, Ultrasound, MRI), you have received a preliminary result during your emergency department visit. Please contact the radiology department [phone] to receive a copy of your final result. Please discuss the Final result with your primary physician or with the follow up physician provided.     Crisis Hotline:  [place] Crisis Hotline:  0-444-QCGSYGP or 1-809.856.4980  [place] Crisis Hotline:    1-594.490.8946 or 995-120-6254         ED Discharge Follow Up Questions    1. In order to provide you with very good care, we would like to follow up with a phone call in the next few days.  May we have your permission to contact you?     YES /  NO    2. What is the best phone number to call you? (       )_____-__________    3. What is the best time to call you?      Morning  /  Afternoon  /   Evening                   Patient Signature:  ____________________________________________________________    Date:  ____________________________________________________________      Your appointments     Apr 21, 2017 11:00 AM   Lymphedema 60 Minute Follow Up with KRISTIN MalloyTOlena   [facility] ([address] [place] [address])    [address]  [place] [address]   [phone]            Apr 24, 2017  1:00 PM   Lymphedema 60 Minute Follow Up with Malena Angela P.T.   [facility] ([address] [place] [address])    [address]  [place] [address]   [phone]            Apr 27, 2017 11:00 AM   Lymphedema 60 Minute Follow Up with Malena Rincon P.T.   [facility] ([address] [place] [address])    [address]  [place] [address]   [phone]            May 02, 2017  2:00 PM   Lymphedema 60 Minute Follow Up with Sasha Suarez POlenaTOlena   [facility] ([address] [place] [address])    [address]  [place] [address]   [phone]            May 04, 2017  1:00 PM   Initial Behavioral Health Eval with Umm Galdamez L.C.S.W.   [facility] [address] [facility] ([facility])    [address] [facility]  [address]  [place] [address]   [phone]            May 16, 2017  3:00 PM   Established Patient with MARTA Perla   [facility] [address]    [address]  [address]  [place] [address]   [phone]           You will be receiving a confirmation call a few days before your appointment from our automated call confirmation system.

## 2017-04-21 ENCOUNTER — PATIENT MESSAGE (OUTPATIENT)
Dept: MEDICAL GROUP | Facility: MEDICAL CENTER | Age: 37
End: 2017-04-21

## 2017-04-21 DIAGNOSIS — Z72.0 TOBACCO ABUSE: ICD-10-CM

## 2017-04-21 NOTE — TELEPHONE ENCOUNTER
From: Vanda Cortez  To: MARTA Perla  Sent: 4/21/2017 12:02 PM PDT  Subject: Non-Urgent Medical Question    Good day to you, I am requesting a referral for the [facility] smoking concession program. I have pick back up and would like some help. I have talked to Haritha at [facility] and she told me to just send you an email to get a referral. I have Medicare so this is necessary. Thank you for your time Vanda Cortez

## 2017-04-21 NOTE — ED NOTES
".  Chief Complaint   Patient presents with   • Sore Throat     since Monday, worsened last night     ./60 mmHg  Pulse 93  Temp(Src) 37.1 °C (98.8 °F)  Resp 14  Ht 1.651 m (5' 5\")  Wt 77.2 kg (170 lb 3.1 oz)  BMI 28.32 kg/m2  SpO2 98%    Ambulatory to triage w/left sided throat pain, educated on triage process, placed in lobby, told to inform staff of any changes in condition.    "

## 2017-04-21 NOTE — ED PROVIDER NOTES
"ED Provider Note    Scribed for Parker Yoo M.D. by Josue Santos. 4/20/2017, 10:27 PM.    Primary care provider: MARTA Perla  Means of arrival: Walk-in  History obtained from: Patient  History limited by: None    CHIEF COMPLAINT  Chief Complaint   Patient presents with   • Sore Throat     since Monday, worsened last night       HPI  Vanda Cortez is a 36 y.o. female who presents to the Emergency Department with sore throat onset 4/17/17. Patient states her pain worsened last night and she is having pain with swallowing. She tells me she also feels her tonsils are swollen. She reports diarrhea but denies vomiting or abdominal pain. Patient denies fever, chills, cough, or other symptoms.     REVIEW OF SYSTEMS  Pertinent positives include sore throat, painful swallowing, tonsillar swelling, diarrhea. Pertinent negatives include no fever, chills, cough, vomiting, abdominal pain. E.    PAST MEDICAL HISTORY   has a past medical history of Cancer (CMS-HCC); Anxiety; Hypothyroid; and Depression.    SURGICAL HISTORY   has past surgical history that includes thyroidectomy total and wide excision melanoma, leg, w/poss.stsg.    SOCIAL HISTORY  Social History   Substance Use Topics   • Smoking status: Former Smoker -- 0.25 packs/day for 13 years   • Smokeless tobacco: Former User   • Alcohol Use: No      History   Drug Use No       FAMILY HISTORY  Non-contributory    CURRENT MEDICATIONS  Reviewed.  See Encounter Summary.     ALLERGIES  Allergies   Allergen Reactions   • Latex Rash   • Morphine Rash   • Sulfa Drugs Itching     Skin starts to slough of   • Sulfamethoxazole-Trimethoprim Rash       PHYSICAL EXAM  VITAL SIGNS: /60 mmHg  Pulse 93  Temp(Src) 37.1 °C (98.8 °F)  Resp 14  Ht 1.651 m (5' 5\")  Wt 77.2 kg (170 lb 3.1 oz)  BMI 28.32 kg/m2  SpO2 98%  Constitutional: Alert and oriented x 3, minimal distress.  HENT: Normocephalic, Atraumatic, Bilateral external ears normal. Nose normal. Moderate " "posterior oropharyngeal erythema, grade 2/4 bilateral tonsillar enlargement  Eyes: Pupils are equal and reactive. Conjunctiva normal, non-icteric.   Heart: Regular rate and rythm, no murmurs, equal pulses peripherally x 4.    Lungs: Clear to auscultation bilaterally, no wheezes rales or rhonchi  Skin: Warm, Dry, No erythema, No rash.   Neurologic: Oriented x3    LABS  No results found for this or any previous visit.   All labs reviewed by me.    COURSE & MEDICAL DECISION MAKING  Nursing notes, VS, PMSFHx reviewed in chart.    10:27 PM - Patient seen and examined at bedside. I explained to the patient she will be tested for strep, but she likely has a viral pharyngitis. We disucssed she can await the results or she can be discharged, and will be contacted if the results are positive. We also discussed I will treat her with a dose of steroids, and an injection of antiinflammatories. I explained if her strep results are positive I will write a prescription for antibiotics, which can be called in if she wishes to leave after the sample is obtained. The patient stated she would like to be discharged home after the swab is obtained for the strep screen. She will follow up with primary care as needed and will return to the ED for difficulty breathing, fever not relieved by anti-pyretics, or other medical concerns. Patient will be treated with Decadron 10 mg PO, Toradol IV. Ordered rapid strep to evaluate her symptoms.   /76 mmHg  Pulse 88  Temp(Src) 37.1 °C (98.8 °F)  Resp 18  Ht 1.651 m (5' 5\")  Wt 77.2 kg (170 lb 3.1 oz)  BMI 28.32 kg/m2  SpO2 98%      The patient will return for new or worsening symptoms and is stable at the time of discharge.    The patient is referred to a primary physician for blood pressure management, diabetic screening, and for all other preventative health concerns.    DISPOSITION:  Patient will be discharged home in stable condition.    FOLLOW UP:  MARTA Perla [address]  [address]  [place] [id number] [phone]    In 2 days  if symptoms persist    [facility], Emergency Dept  [address]  [place] [id number] [phone]    If symptoms worsen        FINAL IMPRESSION  1. Viral pharyngitis         This dictation has been created using voice recognition software and/or scribes. The accuracy of the dictation is limited by the abilities of the software and the expertise of the scribes. I expect there may be some errors of grammar and possibly content. I made every attempt to manually correct the errors within my dictation. However, errors related to voice recognition software and/or scribes may still exist and should be interpreted within the appropriate context.     Josue NOONAN (Scribe), am scribing for, and in the presence of, Parker Yoo M.D..    Electronically signed by: Josue Santos (Scribe), 4/20/2017    IParker M.D. personally performed the services described in this documentation, as scribed by Josue Santos in my presence, and it is both accurate and complete.    The note accurately reflects work and decisions made by me.  Parker Yoo  4/21/2017  5:31 AM

## 2017-04-21 NOTE — DISCHARGE INSTRUCTIONS
Sore Throat  A sore throat is pain, burning, irritation, or scratchiness of the throat. There is often pain or tenderness when swallowing or talking. A sore throat may be accompanied by other symptoms, such as coughing, sneezing, fever, and swollen neck glands. A sore throat is often the first sign of another sickness, such as a cold, flu, strep throat, or mononucleosis (commonly known as mono). Most sore throats go away without medical treatment.  CAUSES   The most common causes of a sore throat include:  · A viral infection, such as a cold, flu, or mono.  · A bacterial infection, such as strep throat, tonsillitis, or whooping cough.  · Seasonal allergies.  · Dryness in the air.  · Irritants, such as smoke or pollution.  · Gastroesophageal reflux disease (GERD).  HOME CARE INSTRUCTIONS   · Only take over-the-counter medicines as directed by your caregiver.  · Drink enough fluids to keep your urine clear or pale yellow.  · Rest as needed.  · Try using throat sprays, lozenges, or sucking on hard candy to ease any pain (if older than 4 years or as directed).  · Sip warm liquids, such as broth, herbal tea, or warm water with honey to relieve pain temporarily. You may also eat or drink cold or frozen liquids such as frozen ice pops.  · Gargle with salt water (mix 1 tsp salt with 8 oz of water).  · Do not smoke and avoid secondhand smoke.  · Put a cool-mist humidifier in your bedroom at night to moisten the air. You can also turn on a hot shower and sit in the bathroom with the door closed for 5-10 minutes.  SEEK IMMEDIATE MEDICAL CARE IF:  · You have difficulty breathing.  · You are unable to swallow fluids, soft foods, or your saliva.  · You have increased swelling in the throat.  · Your sore throat does not get better in 7 days.  · You have nausea and vomiting.  · You have a fever or persistent symptoms for more than 2-3 days.  · You have a fever and your symptoms suddenly get worse.  MAKE SURE YOU:   · Understand  these instructions.  · Will watch your condition.  · Will get help right away if you are not doing well or get worse.     This information is not intended to replace advice given to you by your health care provider. Make sure you discuss any questions you have with your health care provider.     Document Released: 01/25/2006 Document Revised: 01/08/2016 Document Reviewed: 08/25/2013  [employer] Interactive Patient Education ©2016 [employer]

## 2017-04-21 NOTE — ED NOTES
Discharge instructions provided & verbalized understanding of follow-up care, & reasons to return to ED.  Will call with strep results if positive.  Released in stable condition.

## 2017-04-22 ENCOUNTER — OFFICE VISIT (OUTPATIENT)
Dept: URGENT CARE | Facility: CLINIC | Age: 37
End: 2017-04-22
Payer: MEDICARE

## 2017-04-22 VITALS
RESPIRATION RATE: 15 BRPM | TEMPERATURE: 98.9 F | OXYGEN SATURATION: 99 % | SYSTOLIC BLOOD PRESSURE: 112 MMHG | WEIGHT: 170 LBS | HEIGHT: 65 IN | HEART RATE: 71 BPM | BODY MASS INDEX: 28.32 KG/M2 | DIASTOLIC BLOOD PRESSURE: 64 MMHG

## 2017-04-22 DIAGNOSIS — J03.90 EXUDATIVE TONSILLITIS: ICD-10-CM

## 2017-04-22 LAB
HETEROPH AB SER QL LA: NORMAL
INT CON NEG: NEGATIVE
INT CON POS: POSITIVE
S PYO SPEC QL CULT: NORMAL
SIGNIFICANT IND 70042: NORMAL
SITE SITE: NORMAL
SOURCE SOURCE: NORMAL

## 2017-04-22 PROCEDURE — 1036F TOBACCO NON-USER: CPT | Performed by: PHYSICIAN ASSISTANT

## 2017-04-22 PROCEDURE — 99203 OFFICE O/P NEW LOW 30 MIN: CPT | Performed by: PHYSICIAN ASSISTANT

## 2017-04-22 PROCEDURE — G8419 CALC BMI OUT NRM PARAM NOF/U: HCPCS | Performed by: PHYSICIAN ASSISTANT

## 2017-04-22 PROCEDURE — 86308 HETEROPHILE ANTIBODY SCREEN: CPT | Performed by: PHYSICIAN ASSISTANT

## 2017-04-22 PROCEDURE — G8432 DEP SCR NOT DOC, RNG: HCPCS | Performed by: PHYSICIAN ASSISTANT

## 2017-04-22 RX ORDER — AZITHROMYCIN 250 MG/1
TABLET, FILM COATED ORAL
Qty: 6 TAB | Refills: 0 | Status: SHIPPED | OUTPATIENT
Start: 2017-04-22 | End: 2017-05-30

## 2017-04-22 RX ORDER — IBUPROFEN 800 MG/1
800 TABLET ORAL EVERY 8 HOURS PRN
Qty: 20 TAB | Refills: 0 | Status: SHIPPED | OUTPATIENT
Start: 2017-04-22 | End: 2017-05-30

## 2017-04-22 NOTE — MR AVS SNAPSHOT
"        Vanda Cortez   2017 12:30 PM   Office Visit   MRN: [id number]    Department:  [facility]   Dept Phone:  [phone]    Description:  Female : 1980   Provider:  Taisha Lucas PA-C           Reason for Visit     Pharyngitis w/left sided \"throat swelling\" and was in ER tested for strep 2 days ago which was negative      Allergies as of 2017     Allergen Noted Reactions    Latex 2016   Rash    Morphine 2016   Rash    Sulfa Drugs 2016   Itching    Skin starts to slough of    Sulfamethoxazole-Trimethoprim 2016   Rash      You were diagnosed with     Exudative tonsillitis   [[id number]]         Vital Signs     Blood Pressure Pulse Temperature Respirations Height Weight    112/64 mmHg 71 37.2 °C (98.9 °F) 15 1.651 m (5' 5\") 77.111 kg (170 lb)    Body Mass Index Oxygen Saturation Breastfeeding? Smoking Status          28.29 kg/m2 99% No Former Smoker        Basic Information     Date Of Birth Sex Race Ethnicity Preferred Language    1980 Female White Non- English      Your appointments     2017  1:00 PM   Lymphedema 60 Minute Follow Up with Malena Angela PCHUCK   [facility] ([address] [place] [address])    [address]  [place] [address]   [phone]            2017 11:00 AM   Lymphedema 60 Minute Follow Up with Malena Rincon P.T.   [facility] ([address] [place] [address])    [address]  [place] [address]   [phone]            May 02, 2017  2:00 PM   Lymphedema 60 Minute Follow Up with KRISTIN WaldronTOlena   [facility] ([address] [place] [address])    [address]  [place] [address]   [phone]            May 04, 2017  1:00 PM   Initial Behavioral Health Eval with mUm Galdamez L.C.S.W.   [facility] [address] ([facility])    [address] [facility]  [address]  [place] [address]   [phone]            May 16, 2017  3:00 PM   Established Patient with MARTA Perla   [facility]  [address] ([address])   " [address] [facility] [address]  [place] [address]   [phone]           You will be receiving a confirmation call a few days before your appointment from our automated call confirmation system.              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   12/19/2012 - Present    Depression F32.9   10/13/2012 - Present    Postoperative hypothyroidism E89.0   10/12/2012 - Present      Health Maintenance        Date Due Completion Dates    IMM DTaP/Tdap/Td Vaccine (1 - Tdap) 9/19/2001 ---    PAP SMEAR 9/19/2003 ---            Results     POCT Mononucleosis (mono)      Component    Heterophile Screen    neg    Internal Control Positive    Positive    Internal Control Negative    Negative                        Current Immunizations     Influenza Vaccine Quad Inj (Preserved) 10/26/2015, 10/11/2013, 10/28/2010      Below and/or attached are the medications your provider expects you to take. Review all of your home medications and newly ordered medications with your provider and/or pharmacist. Follow medication instructions as directed by your provider and/or pharmacist. Please keep your medication list with you and share with your provider. Update the information when medications are discontinued, doses are changed, or new medications (including over-the-counter products) are added; and carry medication information at all times in the event of emergency situations     Allergies:  LATEX - Rash     MORPHINE - Rash     SULFA DRUGS - Itching     SULFAMETHOXAZOLE-TRIMETHOPRIM - Rash               Medications  Valid as of: April 22, 2017 -  1:18 PM    Generic Name Brand Name Tablet Size Instructions for use    Azithromycin (Tab) ZITHROMAX 250 MG Take as directed        Escitalopram Oxalate (Tab) LEXAPRO 20 MG Take 20 mg by mouth every day.         Ibuprofen (Tab) MOTRIN 800 MG Take 1 Tab by mouth every 8 hours as needed (with food.).        Levothyroxine Sodium (Tab) SYNTHROID 150 MCG Take 150 mcg by mouth Every morning on an empty stomach.        .                 Medicines prescribed today were sent to:     [id number] - [place], [place] - [address] [facility] [address] [facility]    [address] [facility] [place] [address]    Phone: [phone] Fax: [phone]    Open 24 Hours?: No      Medication refill instructions:       If your prescription bottle indicates you have medication refills left, it is not necessary to call your provider’s office. Please contact your pharmacy and they will refill your medication.    If your prescription bottle indicates you do not have any refills left, you may request refills at any time through one of the following ways: The online ActualMeds system (except Urgent Care), by calling your provider’s office, or by asking your pharmacy to contact your provider’s office with a refill request. Medication refills are processed only during regular business hours and may not be available until the next business day. Your provider may request additional information or to have a follow-up visit with you prior to refilling your medication.   *Please Note: Medication refills are assigned a new Rx number when refilled electronically. Your pharmacy may indicate that no refills were authorized even though a new prescription for the same medication is available at the pharmacy. Please request the medicine by name with the pharmacy before contacting your provider for a refill.           ActualMeds Access Code: Activation code not generated  Current ActualMeds Status: Active

## 2017-04-22 NOTE — PROGRESS NOTES
"Chief Complaint   Patient presents with   • Pharyngitis     w/left sided \"throat swelling\" and was in ER tested for strep 2 days ago which was negative       HISTORY OF PRESENT ILLNESS: Patient is a 36 y.o. female who presents today for persistent pain, swelling on left side of throat/tonsil.  She was seen in ED 2 days ago and her throat was swabbed for strep.  She was told it was negative and given Toradol shot and some oral steroids.  She felt good she states for 24 hours but then returned and she is very concerned she may need an antibiotic.  She has not had fevers and has been checking this regularly.   She has had a bit of aching.  She has been tired but not overtly so.  She has not attempted any other interventions since the ER.     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     • levothyroxine (SYNTHROID) 150 MCG Tab Take 150 mcg by mouth Every morning on an empty stomach.       No current [facility]-ordered facility-administered medications on file.       Past Medical History   Diagnosis Date   • Cancer (CMS-HCC)      melanoma - 2014   • Anxiety    • Hypothyroid    • Depression        Social History   Substance Use Topics   • Smoking status: Former Smoker -- 0.25 packs/day for 13 years   • Smokeless tobacco: Former User   • Alcohol Use: No       No family status information on file.     Family History   Problem Relation Age of Onset   • Cancer Father      Squamous cell carcinoma   • Cancer Paternal Grandmother      Squamous cell carcinoma   • Cancer Paternal Grandfather      " "Squamous cell carcinoma       ROS:  Review of Systems   Constitutional: Negative for fever, chills, weight loss and malaise/fatigue.   HENT: SEE HPI  Eyes: Negative for blurred vision.   Respiratory: Negative for cough, sputum production, shortness of breath and wheezing.    Cardiovascular: Negative for chest pain, palpitations, orthopnea and leg swelling.   Gastrointestinal: Negative for heartburn, nausea, vomiting and abdominal pain.   .rosnge      Exam:  Blood pressure 112/64, pulse 71, temperature 37.2 °C (98.9 °F), resp. rate 15, height 1.651 m (5' 5\"), weight 77.111 kg (170 lb), SpO2 99 %, not currently breastfeeding.  General:  Well nourished, well developed female in NAD  Eyes: PERRLA, EOM within normal limits, no conjunctival injection, no scleral icterus, visual fields and acuity grossly intact.  Ears: Normal shape and symmetry, no tenderness, no discharge. External canals are without any significant edema or erythema. Tympanic membranes are without any inflammation, no effusion. Gross auditory acuity is intact  Nose: Symmetrical, sinuses without tenderness, no discharge.   Mouth: reasonable hygiene, moderate left sided erythema, left scant right sided exudates and mild left sided tonsillar enlargement.   Neck: no masses, range of motion within normal limits, no tracheal deviation. Tender left submandibular lymphadenopathy  Pulmonary: Normal respiratory effort, no wheezes, crackles, or rhonchi.  Cardiovascular: regular rate and rhythm without murmurs, rubs, or gallops.  Skin: No visible rashes or lesion. Warm, pink, dry.   Extremities: no clubbing, cyanosis, or edema.  Neuro: A&O x 3. Speech normal/clear.  Normal gait.         Assessment/Plan:  1. Exudative tonsillitis  POCT Mononucleosis (mono)    azithromycin (ZITHROMAX) 250 MG Tab    ibuprofen (MOTRIN) 800 MG Tab       -Mono negative.   -patient very anxious about the throat, admits also due to her medical history/history of concerns and admits it makes " her very vigilant.   -persistent 1 sided tonsillitis, will cover as above, she declines further steroids.     -take Motrin with food.   -RTC precautions, PCP follow up as needed    Supportive care, differential diagnoses, and indications for immediate follow-up discussed with patient.   Pathogenesis of diagnosis discussed including typical length and natural progression.   Instructed to return to clinic or nearest emergency department for any change in condition, further concerns, or worsening of symptoms.  Patient states understanding of the plan of care and discharge instructions.  Instructed to make an appointment, for follow up, with their primary care provider.      Taisha Lucas PA-C

## 2017-04-27 ENCOUNTER — NON-PROVIDER VISIT (OUTPATIENT)
Dept: WOUND CARE | Facility: MEDICAL CENTER | Age: 37
End: 2017-04-27
Attending: NURSE PRACTITIONER
Payer: MEDICARE

## 2017-04-27 PROCEDURE — 97535 SELF CARE MNGMENT TRAINING: CPT

## 2017-04-27 NOTE — CERTIFICATION
[facility]  [facility]  [address]  [address]  [place] [address]  [phone] Fax: [phone]    Lymphedema PROGRESS Note  For Certification Period: 4-27-17 to 5-27-17  visit  G-code  C-code  kx modifier     #4  4/27/17  8990/8991 CN no                     Referring Physician:NESHA Perla  Primary Physician:  same  Consulting Physicians:    edema(s): R LE  Start of Care:3/14/17  Date of Surgery:  2014  Dates of chemotherapy or radiation: 10/2014    Subjective:        SUBJECTIVE:  4-27-17 - PROGRESS note -- pt wears tights w/ additional layer of leggings for compression as bandaging was difficult and her toe swelled.  She performed massage on her own toe and compression and it improved.  In the past she had a lymphoscintogram of the R thigh.  Has missed several appts due to infection.    HPI:Pt is 35 y/o female s/p melanoma resection on R proximal anterior thigh surgically removed at [facility] in Oct 2014.  Pt reports having at least 9 nodes removed then states she had all nodes removed.  Pt developed a seroma on medial thigh after drain removal and was caring for this on her own until it resolved.  Pt then developed R LE lymphedema and was treated in [place] for CDT at [facility].  Pt was able to reduce leg volume, obtain compression pantyhose and a lymphatic pump.  She also had a surgeon in [place] drain the seroma at one time.  Pt has since moved to [place] and is having an exacerbation of R thigh edema and is concerned the seroma is back.  She is very active and is not able to return to sport due to this. She has purchased bandaging supplies and would like to try this but would like direction so she does it correctly.    Pain: none, just discomfort due to thigh rubbing with all ambulatory activities    Past Medical History:   Past Medical History   Diagnosis Date   • Cancer (CMS-HCC)      melanoma - 2014   • Anxiety    • Hypothyroid    • Depression    Current Medications:    Current outpatient prescriptions:   •  azithromycin (ZITHROMAX) 250 MG Tab, Take as directed, Disp: 6 Tab, Rfl: 0  •  ibuprofen (MOTRIN) 800 MG Tab, Take 1 Tab by mouth every 8 hours as needed (with food.)., Disp: 20 Tab, Rfl: 0  •  escitalopram (LEXAPRO) 20 MG tablet, Take 20 mg by mouth every day., Disp: , Rfl:   •  levothyroxine (SYNTHROID) 150 MCG Tab, Take 150 mcg by mouth Every morning on an empty stomach., Disp: , Rfl:   Past Surgical History:   Past Surgical History   Procedure Laterality Date   • Thyroidectomy total       2008   • Wide excision melanoma, leg, w/poss.stsg       2014     Social History:  Social History     Social History   • Marital Status: Single     Spouse Name: N/A   • Number of Children: N/A   • Years of Education: N/A     Occupational History   • Not on file.     Social History Main Topics   • Smoking status: Former Smoker -- 0.25 packs/day for 13 years   • Smokeless tobacco: Former User   • Alcohol Use: No   • Drug Use: No   • Sexual Activity: Not on file     Other Topics Concern   • Not on file     Social History Narrative        Objective:      Tests, Measures and Referrals:  4-27-17 - had lymphoscintogram in past  3/14/17:  Palpable pulses DP, PT R LE    Orthotic, protective, supportive devices:   3-24-17 - discussion re: use of p-hose 1-2 layers, vs bandaging w/ modification.  Suggested bike shorts to maintain position of bandaging.  3-14-17 Pt has 30-40 mmhg compression tights,  Pt uses a lymphatic pump, but it is not flexitouch, once a day in the morning prior to donning compression garment.  Pt does not sleep with any garment    Fall Risk Assessment (lily all that apply with an X):3/14/17completed- not a fall risk    Skin condition:    edema, scars- significant on anterior mid thigh and in inguinal region R , tenderness, , shiny,  hair,sulci,       Edema / Girth measurements:        Lower Extremity Lymphedema Girth Record    Position when measuring:   Standing____    Long-sitting__X__   Supine____      Date: 3/14/17 3-24-17 4/5/17    Location:   R       L R Affected _____ Affected _____          L       K       J       I  66.8 67    H 61.8     55.8 65.6 63    G 51        46.8 53.1 52.5    F 38        37 40.5 37.5    E 33.8     31.7 31 33    D 37.4     35.6 27.4 38    C 27.5     28 29.2 29.5    B 19.8     19.8 19.8 19.8    A  (foot measurement) 20.5     20.5 20.2 20.5      Measurements are in 10cm increments.   Keep the foot / leg relationship as close to 90 degrees as possible.  Measurement A is measured 10cm from base of 3rd toenail.    Measurement B is measured 10cm from the heel up the lateral leg and so on every 10cm.      Procedures performed:  Patient Education:  4-27-17 - PROGRESS note - no measurements today,  Functional Therapy  45 minutes -  preferred only MLD and deeper soft tissue massage to the R LE and trunk.  Added subclavian veins, then anterior/posterior trunk directing fluid to neck.  Suggested she add self massage to pelvic region to prevent additional swelling at the pubis.  Discussed additional compression of bike shorts along w/ tights and leggings.  She has K-tape on and uses this indep at home on her anterior thigh.  She will also try swimming w/ bike shorts for compression.     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                                                                                        4/5/17:    Functional therapy 4 units  Measurements,  Discussion of bandaging and limitations with falling down, difficulty donning due to hand disability, and bunching causing foot edema when she woke one morning- took 3 days to bring down so now bandaging is not a viable option for her  Seroma- question regarding if it returned upon initial evaluation.  Dr. Scuhlz in for modified bedside US to determine there is no current seroma.  Pt was offered more formal test but due to insignificant  findings today this was not pursued.  Garments:  Discussed option of circaid thigh component with LE bandaging or compression stocking or full leg system. Pt would like to see if insurance can cover this.  It was discussed this likely is not covered but pt was provided with script to [facility] per Dr. RUPA MAGANA tape:  Trial of K tap from R lateral hip to medial mid thigh and another to distal mid thigh incision .  Pt provided with 2 extra pieces and info to order if she deems this is a beneficial therapy for her.  Future:  Continue MLD to determine if this can assist in reduction.  Limited time due to physician intervention and time slot for appt.     3-24-17 -   Functional therapy 60 minutes - began w/ MLD R thigh and pelvis by therapist and demonstrated by pt.  Discussed during that time the followup treatments and pt goals.  Final decisions - continue w/ self massage and use of pump daily.  She wants to stay very active w/ exercises and yoga during the summer.  She wants additional info re: compression w/ bandaging but is very worried re: slippage and discomfort at R thigh.  Suggested that she receive more frequent treatments to change bandaging and compression.  Pt states that she is taking a trip for the summer plus it will be very hot w/ bandages on.  PT suggested that she plan to wrap each morning, and be prepared to change the bandaging at least at the upper thigh, 2-3 x/day until sufficient compression is available.  Modified the bandage wrap technique w/ the use of supine position on bed to wrap lower leg and foot w/ stockinette, rosidal foam, then comprilan 8x2, 10, 12 over knee in figure eight only over knee.  Then pt stood and began w/ stockinette overlapped lower leg, rosidal foam to inguinals and all scars and lymphedematous areas, comprilan 10 and 12 cm x2 each.  Tubigrip G over total leg after testing firmness to match total leg.  Discussed need to change the bandaging during the day if it slips down  into the posterior knee.  We also discussed pt participating in her desired activities when she leaves in 2 weeks, returns, and should continue w/ treatments for the cooler weather.    3-14-17 manual therapy 1 - bandaging. 8,10 ,10, 12,   Initial eval-mod- discussed at length current POC, and recurrent issues.  Explained to pt Dr. Schulz will be notified regarding pt concern of seroma- see below-  4-5-17 Dr. GOODE will US in clinic  Manual therapy:  Pt was shown and bandaged R LE from MTH to superior thigh with tricofix, rosidal foam, comprilan 8, 10 cm x 2, 12 cm.  Pt was shown how to do figure 8 at ankle and knee, as well, but instructed that she does not need to do figure 8 the entire limb.  Also she can try just rosidal foam and does not need large gray foam clam shells that came with her kit.  Pt was provided with spaghetti foam to use in compression pantyhose over area of concern of seroma if she would like to try this for local compression.  Pt to try bandaging at NOC post pump, then daytime garment post am pump  Compression pump- pt uses 1x daily currently, would like to increase time daily but uncertain of brand. Pt did say it is not flexitouch as her insurance and copay was cost prohibitive. It is possible that the pump she is using may be contributing to proximal thigh edema if the leg portion does not come up that high.    Education:  Per above and pt currently should not try inc compression solely proximally as this could potentially increase distal edema.   Plan    AM pump, compression pantyhose (spaghetti foam),  PM pump, bandaging.  Will discuss seroma issue with Dr. GOODE- see below 4-5-17 US seroma in clinic per Dr. GOODE     ROM: WNL though pt feels limited in hip flexion     Gait:  4-27-17 - walks 1 1/2 mi/day  3-24-17 - today noticed limp w/ full bandaging on R LE  3-14-17 WNL    Sensation: intact, with some areas of diminished sensation due to scar tissue     ADL issues:    4-27-17 - PROGRESS note - has  started swimming and is trying different compression to wear.  3-14-17 Pt c/o right thigh rubbing with ambulation, biking, hiking. Pt states she has not been able to ski or snowboard since her surgery in .    Wounds issues:  None- significant scar tisue  Mid thigh and inguinal    Professional Collaboration:   4-27-17 - PROGRESS note sent to Antonella Garner  4/5/17:  Dr. GOODE in to assist with seroma US.  No seroma noted today, full assessment not order due to pt able to visualize and verbalize no evidence of residual seroma   3/14/17:  Initial eval completed and sent to referring provider via [employer]  Reviewed case with Dr. Schulz regarding seroma.  Originally pt was asked to return to previous MD who have cared for her regarding the seroma but pt did not want to return to them.  Dr. GOODE states we can look at the seroma with our US in clinic upon an appt with her to determine if it is still present, problematic and/or if any intervention would be recommended.  Dr. GOODE will be notified upon appt on 4-5-17 for in clinic US    Assessment:      Edema etiology: secondary lymphedema due to melanoma post resection and over 30 nodes removal  In 2014    Rationale for Treatment: reinstate CDT as she has had a flare up and determine areas to improve maintenance phase so she can prevent flare up    Edema Progress:  4-27-17 - PROGRESS - continues to c/o  Swelling at upper inner thigh, using K-tape daily.  4-5-17:  No seroma noted, measurements stable despite LE edema with bandaging issues.   3-24-17 - slight decrease in circumference R LE - possible measurement differences.  3-14-17currently with R thigh flare up with pt concern of recurrence of seroma on medial thigh.  Edema return could also be a result of pump not efficiently clearing entire leg into trunk and fluid could be stagnant above top of sleeve.     Patient tolerance/compliance:   4-27-17 - PROGRESS - continues to be frustrated  4-5-17- still frustrated with thigh edema  but beginning to understand she may not be able to return to L LE volume.  3-24-17 - continues to be anxious re: all aspects of treatment but left w/ slightly elevated emotions   3-14-17Pt emotional and really would like to reduce LE volume so she can return to sport     Complicating factors: questionable seroma on medial thigh- ? Pump efficacy clearing proximal thigh    Need for ongoing Advanced lymphedema services:  Pt. Requires skilled therapuetic management of lymphedema via complete decongestive therapy including compression, exercise, MLD and other therapy.       LTG (4wks) Met (m)    PT/CG will be independent with performance of manual lymphatic drainage techniques. m   PT/CG will perform exercises to facilitate lymphatic drainage independently. m   PT/CG will demonstrate compressive wrapping techniques with min assistance and verbal cues. learning   Pain will decrease to 0 /10.    PT/CG will be independent with care of compressive wraps. m   Girth volume will decrease by 3-4 cm ongoing   PT/CG will obtain proper compressive garments. ongoing   PT/CG will use compressive wraps and garments on an appropriate schedule. Not appropriate   PT/CG will understand the importance of consistent lymphedema management. m   ROM - increase R hip flexion   endurance - able to perform any activities desired   m  m   GOALS met - 6/10 for 4-27-17    NEW GOALS -   1) ongoing decrease in circumference R LE  2) decrease pain to 0/10  3) assess use of all compression garments               Patient Goals: independent management of edema(s)    History: mod  Exam of body systems:mod   Clinical Presentation:mod   Complexity:mod  Plan:        Dressings / Supply Considerations:  4-27-17 - PROGRESS - changing from bandaging (uncomfortable) to leggings/tights/bike shorts  3-24-17 pt to increase pump use to 2x/daily. Pt has bandaging supplies and will try to continue with this to bring volume down.  Ultimately she will need to purchase new  pantyhose to accommodate volume decrease.       Procedures:      Proper skin care  Exercise for lymphatic return  Education   Compressive wrapping Manual lymphatic drainage  ADL Training   Soft tissue mobilization Wound management   Other    Frequency:  1x/wk    Certification Date:    4-27-17 to 5-27-17       At the time of each visit a thorough assessment of the patient is completed to assure appropriateness of our plan of care.  The dressings or modalities may need to be adapted from the original plan to address any significant changes in the environment.    Clinician Signature:______________________________Date:__________________      Physician Signature:_____________________________Date:__________________

## 2017-05-02 ENCOUNTER — NON-PROVIDER VISIT (OUTPATIENT)
Dept: WOUND CARE | Facility: MEDICAL CENTER | Age: 37
End: 2017-05-02
Attending: NURSE PRACTITIONER
Payer: MEDICARE

## 2017-05-02 PROCEDURE — 97140 MANUAL THERAPY 1/> REGIONS: CPT

## 2017-05-02 PROCEDURE — 97535 SELF CARE MNGMENT TRAINING: CPT

## 2017-05-02 NOTE — WOUND TEAM
Advanced Wound Care  [facility]  [address]  [address]  [place] [address]  [phone] Fax: [phone]    Lymphedema PROGRESS Note  For Certification Period: 4-27-17 to 5-27-17  visit  G-code  C-code  kx modifier     #5  5/2/17  8990/8991 CN no                     Referring Physician:NESHA Perla  Primary Physician:  same  Consulting Physicians:    edema(s): R LE  Start of Care:3/14/17  Date of Surgery:  2014  Dates of chemotherapy or radiation: 10/2014    Subjective:        SUBJECTIVE:  4-27-17 - PROGRESS note -- pt wears tights w/ additional layer of leggings for compression as bandaging was difficult and her toe swelled.  She performed massage on her own toe and compression and it improved.  In the past she had a lymphoscintogram of the R thigh.  Has missed several appts due to infection.    HPI:Pt is 37 y/o female s/p melanoma resection on R proximal anterior thigh surgically removed at [facility] in Oct 2014.  Pt reports having at least 9 nodes removed then states she had all nodes removed.  Pt developed a seroma on medial thigh after drain removal and was caring for this on her own until it resolved.  Pt then developed R LE lymphedema and was treated in [place] for CDT at [facility].  Pt was able to reduce leg volume, obtain compression pantyhose and a lymphatic pump.  She also had a surgeon in [place] drain the seroma at one time.  Pt has since moved to [place] and is having an exacerbation of R thigh edema and is concerned the seroma is back.  She is very active and is not able to return to sport due to this. She has purchased bandaging supplies and would like to try this but would like direction so she does it correctly.    Pain: none, just discomfort due to thigh rubbing with all ambulatory activities    Past Medical History:   Past Medical History   Diagnosis Date   • Cancer (CMS-HCC)      melanoma - 2014   • Anxiety    • Hypothyroid    • Depression    Current Medications:    Current outpatient prescriptions:   •  azithromycin (ZITHROMAX) 250 MG Tab, Take as directed, Disp: 6 Tab, Rfl: 0  •  ibuprofen (MOTRIN) 800 MG Tab, Take 1 Tab by mouth every 8 hours as needed (with food.)., Disp: 20 Tab, Rfl: 0  •  escitalopram (LEXAPRO) 20 MG tablet, Take 20 mg by mouth every day., Disp: , Rfl:   •  levothyroxine (SYNTHROID) 150 MCG Tab, Take 150 mcg by mouth Every morning on an empty stomach., Disp: , Rfl:   Past Surgical History:   Past Surgical History   Procedure Laterality Date   • Thyroidectomy total       2008   • Wide excision melanoma, leg, w/poss.stsg       2014     Social History:  Social History     Social History   • Marital Status: Single     Spouse Name: N/A   • Number of Children: N/A   • Years of Education: N/A     Occupational History   • Not on file.     Social History Main Topics   • Smoking status: Former Smoker -- 0.25 packs/day for 13 years   • Smokeless tobacco: Former User   • Alcohol Use: No   • Drug Use: No   • Sexual Activity: Not on file     Other Topics Concern   • Not on file     Social History Narrative        Objective:      Tests, Measures and Referrals:  4-27-17 - had lymphoscintogram in past  3/14/17:  Palpable pulses DP, PT R LE    Orthotic, protective, supportive devices:   3-24-17 - discussion re: use of p-hose 1-2 layers, vs bandaging w/ modification.  Suggested bike shorts to maintain position of bandaging.  3-14-17 Pt has 30-40 mmhg compression tights,  Pt uses a lymphatic pump, but it is not flexitouch, once a day in the morning prior to donning compression garment.  Pt does not sleep with any garment    Fall Risk Assessment (lily all that apply with an X):3/14/17completed- not a fall risk    Skin condition:    edema, scars- significant on anterior mid thigh and in inguinal region R , tenderness, , shiny,  hair,sulci,       Edema / Girth measurements:        Lower Extremity Lymphedema Girth Record    Position when measuring:   Standing____    Long-sitting__X__   Supine____      Date: 3/14/17 3-24-17 4/5/17 5/2/17   Location:   R       L R R R          L       K       J       I  66.8 67 69.7   H 61.8     55.8 65.6 63 64   G 51        46.8 53.1 52.5 51.7   F 38        37 40.5 37.5 38.5   E 33.8     31.7 31 33 33.3   D 37.4     35.6 27.4 38 38.2   C 27.5     28 29.2 29.5 29   B 19.8     19.8 19.8 19.8 21   A  (foot measurement) 20.5     20.5 20.2 20.5      Measurements are in 10cm increments.   Keep the foot / leg relationship as close to 90 degrees as possible.  Measurement A is measured 10cm from base of 3rd toenail.    Measurement B is measured 10cm from the heel up the lateral leg and so on every 10cm.      Procedures performed:  Patient Education:  5/2/17:  Manual 3 units, Functional 1 units  Measurements:  Inconsistently slightly up- pt reports having an [employer] which may have flared her up.    Garments:  U/A to fill script at [facility] so pt is being referred with new script to [facility] for circ aid for thigh and or entire LE. Discussed if she gets just thigh component she will need to bandage LE up to thigh piece.   K tape:  Pt has been enjoying benefits of K tape and 2 pieces from lateral trunk to medial thigh and lateral thigh applied. One more piece over distal scar for tenting effect.    Pump:  Pt wants to only pump at night and is very infrequent.  Still does not know what pump she has, but may be [employer] due to insurance. Pt has classic issues with proximal thigh, hip, pubic edema complicating her issues which is why she is seeking treatment now. She continues to c/o thighs rubbing and irritating with any physical activities and is limiting her which is also psychologically disabling.  Call placed to flexitouch rep to determine if she would qualify for flexitouch now.    MLD:  Complete sequence from SCF to trunk, thigh, LE, foot also utilizing TP and MFR and scar tissue mobilization.      4-27-17 - PROGRESS note - no measurements today,  preferred only MLD and deeper soft tissue massage to the R LE and trunk.  Added subclavian veins, then anterior/posterior trunk directing fluid to neck.  Suggested she add self massage to pelvic region to prevent additional swelling at the pubis.  Discussed additional compression of bike shorts along w/ tights and leggings.  She has K-tape on and uses this indep at home on her anterior thigh.  She will also try swimming w/ bike shorts for compression.     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                                                                                        4/5/17:    Functional therapy 4 units  Measurements,  Discussion of bandaging and limitations with falling down, difficulty donning due to hand disability, and bunching causing foot edema when she woke one morning- took 3 days to bring down so now bandaging is not a viable option for her  Seroma- question regarding if it returned upon initial evaluation.  Dr. Schulz in for modified bedside US to determine there is no current seroma.  Pt was offered more formal test but due to insignificant findings today this was not pursued.  Garments:  Discussed option of circaid thigh component with LE bandaging or compression stocking or full leg system. Pt would like to see if insurance can cover this.  It was discussed this likely is not covered but pt was provided with script to [facility] per Dr. RUPA MAGANA tape:  Trial of K tap from R lateral hip to medial mid thigh and another to distal mid thigh incision .  Pt provided with 2 extra pieces and info to order if she deems this is a beneficial therapy for her.  Future:  Continue MLD to determine if this can assist in reduction.  Limited time due to physician intervention and time slot for appt.     3-24-17 -   Functional therapy 60 minutes - began w/ MLD R thigh and pelvis by therapist and demonstrated by pt.  Discussed during that time the  followup treatments and pt goals.  Final decisions - continue w/ self massage and use of pump daily.  She wants to stay very active w/ exercises and yoga during the summer.  She wants additional info re: compression w/ bandaging but is very worried re: slippage and discomfort at R thigh.  Suggested that she receive more frequent treatments to change bandaging and compression.  Pt states that she is taking a trip for the summer plus it will be very hot w/ bandages on.  PT suggested that she plan to wrap each morning, and be prepared to change the bandaging at least at the upper thigh, 2-3 x/day until sufficient compression is available.  Modified the bandage wrap technique w/ the use of supine position on bed to wrap lower leg and foot w/ stockinette, rosidal foam, then comprilan 8x2, 10, 12 over knee in figure eight only over knee.  Then pt stood and began w/ stockinette overlapped lower leg, rosidal foam to inguinals and all scars and lymphedematous areas, comprilan 10 and 12 cm x2 each.  Tubigrip G over total leg after testing firmness to match total leg.  Discussed need to change the bandaging during the day if it slips down into the posterior knee.  We also discussed pt participating in her desired activities when she leaves in 2 weeks, returns, and should continue w/ treatments for the cooler weather.    3-14-17 manual therapy 1 - bandaging. 8,10 ,10, 12,   Initial eval-mod- discussed at length current POC, and recurrent issues.  Explained to pt Dr. Schulz will be notified regarding pt concern of seroma- see below-  4-5-17 Dr. GOODE will US in clinic  Manual therapy:  Pt was shown and bandaged R LE from MTH to superior thigh with tricofix, rosidal foam, comprilan 8, 10 cm x 2, 12 cm.  Pt was shown how to do figure 8 at ankle and knee, as well, but instructed that she does not need to do figure 8 the entire limb.  Also she can try just rosidal foam and does not need large gray foam clam shells that came with her  kit.  Pt was provided with spaghetti foam to use in compression pantyhose over area of concern of seroma if she would like to try this for local compression.  Pt to try bandaging at NOC post pump, then daytime garment post am pump  Compression pump- pt uses 1x daily currently, would like to increase time daily but uncertain of brand. Pt did say it is not flexitouch as her insurance and copay was cost prohibitive. It is possible that the pump she is using may be contributing to proximal thigh edema if the leg portion does not come up that high.    Education:  Per above and pt currently should not try inc compression solely proximally as this could potentially increase distal edema.   Plan    AM pump, compression pantyhose (spaghetti foam),  PM pump, bandaging.  Will discuss seroma issue with Dr. GOODE- see below 4-5-17 US seroma in clinic per Dr. GOODE     ROM: WNL though pt feels limited in hip flexion     Gait:  4-27-17 - walks 1 1/2 mi/day  3-24-17 - today noticed limp w/ full bandaging on R LE  3-14-17 WNL    Sensation: intact, with some areas of diminished sensation due to scar tissue     ADL issues:    4-27-17 - PROGRESS note - has started swimming and is trying different compression to wear.  3-14-17 Pt c/o right thigh rubbing with ambulation, biking, hiking. Pt states she has not been able to ski or snowboard since her surgery in .    Wounds issues:  None- significant scar tisue  Mid thigh and inguinal    Professional Collaboration:   4-27-17 - PROGRESS note sent to Antonella Garner  4/5/17:  Dr. GOODE in to assist with seroma US.  No seroma noted today, full assessment not order due to pt able to visualize and verbalize no evidence of residual seroma   3/14/17:  Initial eval completed and sent to referring provider via [employer]  Reviewed case with Dr. Schulz regarding seroma.  Originally pt was asked to return to previous MD who have cared for her regarding the seroma but pt did not want to return to them.  Dr. GOODE states  we can look at the seroma with our US in clinic upon an appt with her to determine if it is still present, problematic and/or if any intervention would be recommended.  Dr. GOODE will be notified upon appt on 4-5-17 for in clinic US    Assessment:      Edema etiology: secondary lymphedema due to melanoma post resection and over 30 nodes removal  In 2014    Rationale for Treatment: reinstate CDT as she has had a flare up and determine areas to improve maintenance phase so she can prevent flare up    Edema Progress:  4-27-17 - PROGRESS - continues to c/o  Swelling at upper inner thigh, using K-tape daily.  4-5-17:  No seroma noted, measurements stable despite LE edema with bandaging issues.   3-24-17 - slight decrease in circumference R LE - possible measurement differences.  3-14-17currently with R thigh flare up with pt concern of recurrence of seroma on medial thigh.  Edema return could also be a result of pump not efficiently clearing entire leg into trunk and fluid could be stagnant above top of sleeve.     Patient tolerance/compliance:   4-27-17 - PROGRESS - continues to be frustrated  4-5-17- still frustrated with thigh edema but beginning to understand she may not be able to return to L LE volume.  3-24-17 - continues to be anxious re: all aspects of treatment but left w/ slightly elevated emotions   3-14-17Pt emotional and really would like to reduce LE volume so she can return to sport     Complicating factors: questionable seroma on medial thigh- ? Pump efficacy clearing proximal thigh    Need for ongoing Advanced lymphedema services:  Pt. Requires skilled therapuetic management of lymphedema via complete decongestive therapy including compression, exercise, MLD and other therapy.       LTG (4wks) Met (m)    PT/CG will be independent with performance of manual lymphatic drainage techniques. m   PT/CG will perform exercises to facilitate lymphatic drainage independently. m   PT/CG will demonstrate compressive  wrapping techniques with min assistance and verbal cues. learning   Pain will decrease to 0 /10.    PT/CG will be independent with care of compressive wraps. m   Girth volume will decrease by 3-4 cm ongoing   PT/CG will obtain proper compressive garments. ongoing   PT/CG will use compressive wraps and garments on an appropriate schedule. Not appropriate   PT/CG will understand the importance of consistent lymphedema management. m   ROM - increase R hip flexion   endurance - able to perform any activities desired   m  m   GOALS met - 6/10 for 4-27-17    NEW GOALS -   1) ongoing decrease in circumference R LE  2) decrease pain to 0/10  3) assess use of all compression garments               Patient Goals: independent management of edema(s)    History: mod  Exam of body systems:mod   Clinical Presentation:mod   Complexity:mod  Plan:        Dressings / Supply Considerations:  5/2/17: script for circaid and looking into switching to flexitouch  4-27-17 - PROGRESS - changing from bandaging (uncomfortable) to leggings/tights/bike shorts  3-24-17 pt to increase pump use to 2x/daily. Pt has bandaging supplies and will try to continue with this to bring volume down.  Ultimately she will need to purchase new pantyhose to accommodate volume decrease.       Procedures:      Proper skin care  Exercise for lymphatic return  Education   Compressive wrapping Manual lymphatic drainage  ADL Training   Soft tissue mobilization Wound management   Other    Frequency:  1x/wk    Certification Date:    4-27-17 to 5-27-17       At the time of each visit a thorough assessment of the patient is completed to assure appropriateness of our plan of care.  The dressings or modalities may need to be adapted from the original plan to address any significant changes in the environment.    Clinician Signature:______________________________Date:__________________      Physician Signature:_____________________________Date:__________________

## 2017-05-04 ENCOUNTER — APPOINTMENT (OUTPATIENT)
Dept: BEHAVIORAL HEALTH | Facility: PHYSICIAN GROUP | Age: 37
End: 2017-05-04
Payer: MEDICARE

## 2017-05-05 ENCOUNTER — NON-PROVIDER VISIT (OUTPATIENT)
Dept: VASCULAR LAB | Facility: MEDICAL CENTER | Age: 37
End: 2017-05-05
Attending: INTERNAL MEDICINE
Payer: MEDICARE

## 2017-05-05 DIAGNOSIS — F17.200 NICOTINE DEPENDENCE, UNCOMPLICATED, UNSPECIFIED NICOTINE PRODUCT TYPE: ICD-10-CM

## 2017-05-05 PROCEDURE — 99407 BEHAV CHNG SMOKING > 10 MIN: CPT | Performed by: NURSE PRACTITIONER

## 2017-05-05 RX ORDER — NICOTINE 21 MG/24HR
1 PATCH, TRANSDERMAL 24 HOURS TRANSDERMAL EVERY 24 HOURS
Qty: 30 PATCH | Refills: 2 | Status: SHIPPED | OUTPATIENT
Start: 2017-05-05 | End: 2017-05-30

## 2017-05-05 NOTE — MR AVS SNAPSHOT
Vanda Greenmindy   2017 1:45 PM   Non-Provider Visit   MRN: [id number]    Department:  Vascular Medicine   Dept Phone:  [phone]    Description:  Female : 1980   Provider:  TEJA EXAM 5           Allergies as of 2017     Allergen Noted Reactions    Latex 2016   Rash    Morphine 2016   Rash    Sulfa Drugs 2016   Itching    Skin starts to slough of    Sulfamethoxazole-Trimethoprim 2016   Rash      You were diagnosed with     Nicotine dependence, uncomplicated, unspecified nicotine product type   [[id number]]         Vital Signs     Smoking Status                   Former Smoker           Basic Information     Date Of Birth Sex Race Ethnicity Preferred Language    1980 Female White Non- English      Your appointments     May 11, 2017  6:00 PM   Smoking Cessation Class with PULMONARY REHAB PM CLASS   PULMONARY LAB OP [facility] (--)    [address] [facility] [address] [facility] [address]  [place] [address] [phone]           From [facility], take the [facility] exit (#[address]) and head west on [facility].  Turn right on [address]. Park in the [facility] Parking garage, or you may use our  parking.  The [facility] entrance is left of the  parking by the red and pink heart sculpture.            May 16, 2017  3:00 PM   Established Patient with MARTA Perla   [facility] [address]    [address]  [address]  [place] [address] [phone]           You will be receiving a confirmation call a few days before your appointment from our automated call confirmation system.            May 18, 2017  2:30 PM   Lymphedema 60 Minute Follow Up with Malena Angela P.T.   [facility] ([address])    [address]  [place] [id number] [phone]            May 18, 2017  6:00 PM   Smoking Cessation Class with PULMONARY REHAB PM CLASS   PULMONARY LAB OP [facility] (--)    [address] [facility] [address] [facility] [address]  [place] [address] [phone]           From  [address], take the [facility] exit (#[address]) and head west on [facility] [address] [facility].  Turn right on [address]. Park in the [facility] Parking garage, or you may use our  parking.  The [facility] entrance is left of the  parking by the red and pink heart sculpture.            May 23, 2017  1:00 PM   Lymphedema 60 Minute Follow Up with Sasha Suarez P.T.   W[facility] Care Center ([address] [place] [address])    [address] [facility] [address]  [facility] [address] [phone]            May 25, 2017  3:00 PM   Initial Behavioral Health Eval with Umm Galdamez L.C.S.W.   [facility] [address] ([facility])    [address] [facility]  [address]  [facility] [place] [address]   [phone]            May 25, 2017  6:00 PM   Smoking Cessation Class with PULMONARY REHAB PM CLASS   PULMONARY LAB OP [facility] (--)    [address] [facility] [address] [phone]           From [address], take the [facility] exit (#[address]) and head west on [facility].  Turn right on [address]. Park in the [facility] Parking garage, or you may use our  parking.  The [facility] entrance is left of the  parking by the red and pink heart sculpture.            May 31, 2017  1:00 PM   Lymphedema 60 Minute Follow Up with Sasha Suarez P.T.   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 Review all of your home medications and newly ordered medications with your provider and/or pharmacist. Follow medication instructions as directed by your provider and/or pharmacist. Please keep your medication list with you and share with your provider. Update the information when medications are discontinued, doses are changed, or new medications (including over-the-counter products) are added; and carry medication information at all times in the event of emergency situations     Allergies:  LATEX - Rash     MORPHINE - Rash     SULFA DRUGS - Itching     SULFAMETHOXAZOLE-TRIMETHOPRIM - Rash               Medications  Valid as of: May 05, 2017 -  2:18 PM    Generic Name Brand Name Tablet Size Instructions for use    Azithromycin (Tab) ZITHROMAX 250 MG Take as directed        Escitalopram Oxalate (Tab) LEXAPRO 20 MG Take 20 mg by mouth every day.        Ibuprofen (Tab) MOTRIN 800 MG Take 1 Tab by mouth every 8 hours as needed (with food.).        Levothyroxine Sodium (Tab) SYNTHROID 150 MCG Take 150 mcg by mouth Every morning on an empty stomach.        Nicotine (PATCH 24 HR) NICODERM 14 MG/24HR Apply 1 Patch to skin as directed every 24 hours.        .                 Medicines prescribed today were sent to:     [id number] - [place], [place] - [address] [address]    Phone: [phone] Fax: [phone]    Open 24 Hours?: No      Medication refill instructions:       If your prescription bottle indicates you have medication refills left, it is not necessary to call your provider’s office. Please contact your pharmacy and they will refill your medication.    If your prescription  bottle indicates you do not have any refills left, you may request refills at any time through one of the following ways: The online [employer] system (except Urgent Care), by calling your provider’s office, or by asking your pharmacy to contact your provider’s office with a refill request. Medication refills are processed only during regular business hours and may not be available until the next business day. Your provider may request additional information or to have a follow-up visit with you prior to refilling your medication.   *Please Note: Medication refills are assigned a new Rx number when refilled electronically. Your pharmacy may indicate that no refills were authorized even though a new prescription for the same medication is available at the pharmacy. Please request the medicine by name with the pharmacy before contacting your provider for a refill.           [employer] Access Code: Activation code not generated  Current [employer] Status: Active

## 2017-05-05 NOTE — PROGRESS NOTES
Outpatient Pharmacotherapy Program    Smoking Cessation Note    Reason for Visit: Patient presents for smoking cessation pharmacotherapy  Initial Visit    HPI:    Age at Which Started Smokin, off and on until age 26  Average number of cigarettes smoked per day: 5 cigarettes per day (down from 1 PPD)  Additional Smoking Hx: Lives with a roommate who is a smoker. Tends to crave cigarettes in the evening  Pertinent Psych Hx: Depression and anxiety, takes escitalopram, goes to counseling  Recent quit attempts: She has been trying to cut back cold turkey. Has usded Nicoderm CQ patches in the past with success. She feels the brand name works better than generic     Medications reviewed and reconciled       Assessment:    Tobacco use disorder, willing to make quit attempt with a combination of pharmacological and behavioral therapy.    Plan:    Behavioral Modification Recommended: Specifically [facility]oking Cessation Classes    Quit Date: or other plans to reduce cigarette usage: reduce from 5 cigarettes to none    Nicotine Replacement Therapy            Patch Dose 14 mg  - instructed to use during day but take off each night    She has insomnia - recommended patient remove the patch at night before bed. Instructed to rotate sites. Monitor for skin irriation.   Discussed recognizing triggers and reducing stress, avoiding temptation. Encouraged distraction, changing routines, drinking water, sucking on hard candy or chewing gum and regular exercise.       Potential side-effects of all prescribed pharmacotherapy reviewed with patient who verbalizes understanding of the risks and benefits of this therapy.    Follow-up :  with PCP and In pharmacotherapy clinic:  Date: 2017 at 2:00 pm    Mlaena Scherer    Visit time 2:05 - 2:20 pm

## 2017-05-09 ENCOUNTER — TELEPHONE (OUTPATIENT)
Dept: MEDICAL GROUP | Facility: MEDICAL CENTER | Age: 37
End: 2017-05-09

## 2017-05-09 NOTE — TELEPHONE ENCOUNTER
Future Appointments       Provider Department Center    5/11/2017 6:00 PM PULMONARY REHAB PM CLASS PULMONARY LAB OP [facility]     5/16/2017 3:00 PM MARTA Perla [facility] [address]    5/18/2017 2:30 PM [facility] [address] [place] [address]    5/18/2017 6:00 PM PULMONARY REHAB PM CLASS PULMONARY LAB OP [facility]     5/23/2017 1:00 PM Sasha [facility] [address] [place] [address]    5/25/2017 3:00 PM [facility] [address] [facility]    5/25/2017 6:00 PM PULMONARY REHAB PM CLASS PULMONARY LAB OP [facility]     5/31/2017 1:00 PM S[facility] [address] [place] [address]    6/2/2017 2:00 PM I[facility] EXAM 5 [facility]          ESTABLISHED PATIENT PRE-VISIT PLANNING     Note: Patient will not be contacted if there is no indication to call.     1.  Reviewed note from last office visit with PCP and/or other med group provider: Yes    2.  If any orders were placed at last visit, do we have Results/Consult Notes?        •  Labs - Labs ordered, NOT completed. Patient advised to complete prior to next appointment.via voicemail       •  Imaging - Imaging was not ordered at last office visit.       •  Referrals - No referrals were ordered at last office visit.    3.  [facility]izations were [facility] using [facility]?: Yes       •  Web Iz Recommendations: HEPATITIS A  HEPATITIS B MMR  TDAP VARICELLA (Chicken Pox)     4.  Patient is due for the following Health Maintenance Topics:   Health Maintenance Due   Topic Date Due   • Annual Wellness Visit  1982   • IMM DTaP/Tdap/Td Vaccine (1 - Tdap) 09/19/2001   • PAP SMEAR  09/19/2003           5.  Patient was not informed to arrive 15 min prior to their scheduled appointment and bring in their medication bottles.

## 2017-05-16 ENCOUNTER — APPOINTMENT (OUTPATIENT)
Dept: MEDICAL GROUP | Facility: MEDICAL CENTER | Age: 37
End: 2017-05-16
Payer: MEDICARE

## 2017-05-18 PROBLEM — Z72.0 TOBACCO ABUSE: Status: ACTIVE | Noted: 2017-05-18

## 2017-05-23 ENCOUNTER — NON-PROVIDER VISIT (OUTPATIENT)
Dept: WOUND CARE | Facility: MEDICAL CENTER | Age: 37
End: 2017-05-23
Attending: NURSE PRACTITIONER
Payer: MEDICARE

## 2017-05-23 PROCEDURE — 97535 SELF CARE MNGMENT TRAINING: CPT

## 2017-05-23 PROCEDURE — 97016 VASOPNEUMATIC DEVICE THERAPY: CPT

## 2017-05-23 NOTE — WOUND TEAM
Advanced Wound Care  [facility]  [address]  [address]  [place] [address]  [phone] Fax: [phone]    Lymphedema PROGRESS Note  For Certification Period: 4-27-17 to 5-27-17  visit  G-code  C-code  kx modifier     #3  5/23/17  [id number] CN no                     Referring Physician:NESHA Perla  Primary Physician:  same  Consulting Physicians:    edema(s): R LE  Start of Care:3/14/17  Date of Surgery:  2014  Dates of chemotherapy or radiation: 10/2014    Subjective:        SUBJECTIVE:  4-27-17 - PROGRESS note -- pt wears tights w/ additional layer of leggings for compression as bandaging was difficult and her toe swelled.  She performed massage on her own toe and compression and it improved.  In the past she had a lymphoscintogram of the R thigh.  Has missed several appts due to infection.    HPI:Pt is 35 y/o female s/p melanoma resection on R proximal anterior thigh surgically removed at [facility] in Oct 2014.  Pt reports having at least 9 nodes removed then states she had all nodes removed.  Pt developed a seroma on medial thigh after drain removal and was caring for this on her own until it resolved.  Pt then developed R LE lymphedema and was treated in [place] for CDT at [facility].  Pt was able to reduce leg volume, obtain compression pantyhose and a lymphatic pump.  She also had a surgeon in [place] drain the seroma at one time.  Pt has since moved to [place] and is having an exacerbation of R thigh edema and is concerned the seroma is back.  She is very active and is not able to return to sport due to this. She has purchased bandaging supplies and would like to try this but would like direction so she does it correctly.    Pain: none, just discomfort due to thigh rubbing with all ambulatory activities    Past Medical History:   Past Medical History   Diagnosis Date   • Cancer (CMS-HCC)      melanoma - 2014   • Anxiety    • Hypothyroid    • Depression    • Tobacco abuse  5/18/2017   Current Medications:   Current outpatient prescriptions:   •  nicotine (NICODERM CQ) 14 MG/24HR PATCH 24 HR, Apply 1 Patch to skin as directed every 24 hours., Disp: 30 Patch, Rfl: 2  •  azithromycin (ZITHROMAX) 250 MG Tab, Take as directed, Disp: 6 Tab, Rfl: 0  •  ibuprofen (MOTRIN) 800 MG Tab, Take 1 Tab by mouth every 8 hours as needed (with food.)., Disp: 20 Tab, Rfl: 0  •  escitalopram (LEXAPRO) 20 MG tablet, Take 20 mg by mouth every day., Disp: , Rfl:   •  levothyroxine (SYNTHROID) 150 MCG Tab, Take 150 mcg by mouth Every morning on an empty stomach., Disp: , Rfl:   Past Surgical History:   Past Surgical History   Procedure Laterality Date   • Thyroidectomy total       2008   • Wide excision melanoma, leg, w/poss.stsg       2014     Social History:  Social History     Social History   • Marital Status: Single     Spouse Name: N/A   • Number of Children: N/A   • Years of Education: N/A     Occupational History   • Not on file.     Social History Main Topics   • Smoking status: Former Smoker -- 0.25 packs/day for 13 years   • Smokeless tobacco: Former User   • Alcohol Use: No   • Drug Use: No   • Sexual Activity: Not on file     Other Topics Concern   • Not on file     Social History Narrative        Objective:      Tests, Measures and Referrals:  4-27-17 - had lymphoscintogram in past  3/14/17:  Palpable pulses DP, PT R LE    Orthotic, protective, supportive devices:   3-24-17 - discussion re: use of p-hose 1-2 layers, vs bandaging w/ modification.  Suggested bike shorts to maintain position of bandaging.  3-14-17 Pt has 30-40 mmhg compression tights,  Pt uses a lymphatic pump, but it is not flexitouch, once a day in the morning prior to donning compression garment.  Pt does not sleep with any garment    Fall Risk Assessment (lily all that apply with an X):3/14/17completed- not a fall risk    Skin condition:    edema, scars- significant on anterior mid thigh and in inguinal region R , tenderness, ,  shiny,  hair,sulci,       Edema / Girth measurements:        Lower Extremity Lymphedema Girth Record    Position when measuring:   Standing____   Long-sitting__X__   Supine____      Date: 3/14/17 3-24-17 4/5/17 5/2/17 5/9/17   Location:   R       L R R R Pre/post pump           L        K        J        I  66.8 67 69.7 69.2/ 68.6   H 61.8     55.8 65.6 63 64 67.7/ 63.5   G 51        46.8 53.1 52.5 51.7 56.3/ 54.3   F 38        37 40.5 37.5 38.5 41.6/ 41.8   E 33.8     31.7 31 33 33.3 32.6/ 32.8   D 37.4     35.6 27.4 38 38.2 38.7/ 38.3   C 27.5     28 29.2 29.5 29 30/ 28.7   B 19.8     19.8 19.8 19.8 21 20.6/ 20.3   A  (foot measurement) 20.5     20.5 20.2 20.5  20.6/ 20.6     Measurements are in 10cm increments.   Keep the foot / leg relationship as close to 90 degrees as possible.  Measurement A is measured 10cm from base of 3rd toenail.    Measurement B is measured 10cm from the heel up the lateral leg and so on every 10cm.      Procedures performed:  Patient Education:  5/23/17:  pneumatic pump 4, functional 2 unit  Measurements;  Pump:  Trial flexitouch - trunk, thigh calf foot 60 minutes- working with rep regarding switch along with oncologist as pt has failed with entre pump with increased thigh and hip/trunk edema interfering with pt lifestyle and comfort  Garment:  Just received henrique david full leg piece.  Has tried for 4 nights, works well but has fallen down creating upper thigh edema she believes.  Provided with isoband and shown how to bandage thigh, hip and trunk to hold in place or to use bike shorts or similar to hold in place.  Pt does not need to bandage over this but still recommend using pump prior to use of jovipak.       5/2/17:  Manual 3 units, Functional 1 units  Measurements:  Inconsistently slightly up- pt reports having an [employer] which may have flared her up.    Garments:  U/A to fill script at [facility] so pt is being referred with new script to [facility] for circ aid for thigh and or  entire LE. Discussed if she gets just thigh component she will need to bandage LE up to thigh piece.   K tape:  Pt has been enjoying benefits of K tape and 2 pieces from lateral trunk to medial thigh and lateral thigh applied. One more piece over distal scar for tenting effect.    Pump:  Pt wants to only pump at night and is very infrequent.  Still does not know what pump she has, but may be [employer] due to insurance. Pt has classic issues with proximal thigh, hip, pubic edema complicating her issues which is why she is seeking treatment now. She continues to c/o thighs rubbing and irritating with any physical activities and is limiting her which is also psychologically disabling.  Call placed to flexitouch rep to determine if she would qualify for flexitouch now.    MLD:  Complete sequence from SCF to trunk, thigh, LE, foot also utilizing TP and MFR and scar tissue mobilization.      4-27-17 - PROGRESS note - no measurements today, preferred only MLD and deeper soft tissue massage to the R LE and trunk.  Added subclavian veins, then anterior/posterior trunk directing fluid to neck.  Suggested she add self massage to pelvic region to prevent additional swelling at the pubis.  Discussed additional compression of bike shorts along w/ tights and leggings.  She has K-tape on and uses this indep at home on her anterior thigh.  She will also try swimming w/ bike shorts for compression.     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                                                                                        4/5/17:    Functional therapy 4 units  Measurements,  Discussion of bandaging and limitations with falling down, difficulty donning due to hand disability, and bunching causing foot edema when she woke one morning- took 3 days to bring down so now bandaging is not a viable option for her  Seroma- question regarding if it returned upon initial evaluation.   Dr. Schulz in for modified bedside US to determine there is no current seroma.  Pt was offered more formal test but due to insignificant findings today this was not pursued.  Garments:  Discussed option of circaid thigh component with LE bandaging or compression stocking or full leg system. Pt would like to see if insurance can cover this.  It was discussed this likely is not covered but pt was provided with script to [facility] per Dr. RUPA MAGANA tape:  Trial of K tap from R lateral hip to medial mid thigh and another to distal mid thigh incision .  Pt provided with 2 extra pieces and info to order if she deems this is a beneficial therapy for her.  Future:  Continue MLD to determine if this can assist in reduction.  Limited time due to physician intervention and time slot for appt.     3-24-17 -   Functional therapy 60 minutes - began w/ MLD R thigh and pelvis by therapist and demonstrated by pt.  Discussed during that time the followup treatments and pt goals.  Final decisions - continue w/ self massage and use of pump daily.  She wants to stay very active w/ exercises and yoga during the summer.  She wants additional info re: compression w/ bandaging but is very worried re: slippage and discomfort at R thigh.  Suggested that she receive more frequent treatments to change bandaging and compression.  Pt states that she is taking a trip for the summer plus it will be very hot w/ bandages on.  PT suggested that she plan to wrap each morning, and be prepared to change the bandaging at least at the upper thigh, 2-3 x/day until sufficient compression is available.  Modified the bandage wrap technique w/ the use of supine position on bed to wrap lower leg and foot w/ stockinette, rosidal foam, then comprilan 8x2, 10, 12 over knee in figure eight only over knee.  Then pt stood and began w/ stockinette overlapped lower leg, rosidal foam to inguinals and all scars and lymphedematous areas, comprilan 10 and 12 cm x2 each.   Tubigrip G over total leg after testing firmness to match total leg.  Discussed need to change the bandaging during the day if it slips down into the posterior knee.  We also discussed pt participating in her desired activities when she leaves in 2 weeks, returns, and should continue w/ treatments for the cooler weather.    3-14-17 manual therapy 1 - bandaging. 8,10 ,10, 12,   Initial eval-mod- discussed at length current POC, and recurrent issues.  Explained to pt Dr. Schulz will be notified regarding pt concern of seroma- see below-  4-5-17 Dr. GOODE will US in clinic  Manual therapy:  Pt was shown and bandaged R LE from MTH to superior thigh with tricofix, rosidal foam, comprilan 8, 10 cm x 2, 12 cm.  Pt was shown how to do figure 8 at ankle and knee, as well, but instructed that she does not need to do figure 8 the entire limb.  Also she can try just rosidal foam and does not need large gray foam clam shells that came with her kit.  Pt was provided with spaghetti foam to use in compression pantyhose over area of concern of seroma if she would like to try this for local compression.  Pt to try bandaging at NOC post pump, then daytime garment post am pump  Compression pump- pt uses 1x daily currently, would like to increase time daily but uncertain of brand. Pt did say it is not flexitouch as her insurance and copay was cost prohibitive. It is possible that the pump she is using may be contributing to proximal thigh edema if the leg portion does not come up that high.    Education:  Per above and pt currently should not try inc compression solely proximally as this could potentially increase distal edema.   Plan    AM pump, compression pantyhose (spaghetti foam),  PM pump, bandaging.  Will discuss seroma issue with Dr. GOODE- see below 4-5-17 US seroma in clinic per Dr. GOODE     ROM: WNL though pt feels limited in hip flexion     Gait:  5/23/17:  Depending on thigh volume does well once reduced  4-27-17 - walks 1 1/2  mi/day  3-24-17 - today noticed limp w/ full bandaging on R LE  3-14-17 WNL    Sensation: intact, with some areas of diminished sensation due to scar tissue     ADL issues:    4-27-17 - PROGRESS note - has started swimming and is trying different compression to wear.  3-14-17 Pt c/o right thigh rubbing with ambulation, biking, hiking. Pt states she has not been able to ski or snowboard since her surgery in .    Wounds issues:  None- significant scar tisue  Mid thigh and inguinal    Professional Collaboration:   5/23/17:  Spoke with Rosa NEVAREZ at [facility] to discuss upcoming appointment with Dr. Crum regarding pt failure with current entre pump and would like Dr. Crum input for transitioning to flexitouch pump which pt has responded well to with upper thigh and trunk component.  4-27-17 - PROGRESS note sent to Antonella Garner  4/5/17:  Dr. GOODE in to assist with seroma US.  No seroma noted today, full assessment not order due to pt able to visualize and verbalize no evidence of residual seroma   3/14/17:  Initial eval completed and sent to referring provider via iGo  Reviewed case with Dr. Schulz regarding seroma.  Originally pt was asked to return to previous MD who have cared for her regarding the seroma but pt did not want to return to them.  Dr. GOODE states we can look at the seroma with our US in clinic upon an appt with her to determine if it is still present, problematic and/or if any intervention would be recommended.  Dr. GOODE will be notified upon appt on 4-5-17 for in clinic US    Assessment:      Edema etiology: secondary lymphedema due to melanoma post resection and over 30 nodes removal  In 2014    Rationale for Treatment: reinstate CDT as she has had a flare up and determine areas to improve maintenance phase so she can prevent flare up    Edema Progress:  5/23/17:  Responded very well to trial of flexitouch pump- reduction noted in proximal thigh after 1 hour use.   4-27-17 - PROGRESS -  continues to c/o  Swelling at upper inner thigh, using K-tape daily.  4-5-17:  No seroma noted, measurements stable despite LE edema with bandaging issues.   3-24-17 - slight decrease in circumference R LE - possible measurement differences.  3-14-17currently with R thigh flare up with pt concern of recurrence of seroma on medial thigh.  Edema return could also be a result of pump not efficiently clearing entire leg into trunk and fluid could be stagnant above top of sleeve.     Patient tolerance/compliance:   5/23/17:  Trial of flexitouch, pt would like to consider this as her current pump halts and proximal edema is very frustrating to pt ADL and sports activity  4-27-17 - PROGRESS - continues to be frustrated  4-5-17- still frustrated with thigh edema but beginning to understand she may not be able to return to L LE volume.  3-24-17 - continues to be anxious re: all aspects of treatment but left w/ slightly elevated emotions   3-14-17Pt emotional and really would like to reduce LE volume so she can return to sport     Complicating factors: questionable seroma on medial thigh- ? Pump efficacy clearing proximal thigh    Need for ongoing Advanced lymphedema services:  Pt. Requires skilled therapuetic management of lymphedema via complete decongestive therapy including compression, exercise, MLD and other therapy.       LTG (4wks) Met (m)    PT/CG will be independent with performance of manual lymphatic drainage techniques. m   PT/CG will perform exercises to facilitate lymphatic drainage independently. m   PT/CG will demonstrate compressive wrapping techniques with min assistance and verbal cues. learning   Pain will decrease to 0 /10.    PT/CG will be independent with care of compressive wraps. m   Girth volume will decrease by 3-4 cm ongoing   PT/CG will obtain proper compressive garments. ongoing   PT/CG will use compressive wraps and garments on an appropriate schedule. Not appropriate   PT/CG will understand  the importance of consistent lymphedema management. m   ROM - increase R hip flexion   endurance - able to perform any activities desired   m  m   GOALS met - 6/10 for 4-27-17    NEW GOALS -   1) ongoing decrease in circumference R LE  2) decrease pain to 0/10  3) assess use of all compression garments               Patient Goals: independent management of edema(s)    History: mod  Exam of body systems:mod   Clinical Presentation:mod   Complexity:mod  Plan:        Dressings / Supply Considerations:  5/2/17: script for circaid and looking into switching to flexitouch  4-27-17 - PROGRESS - changing from bandaging (uncomfortable) to leggings/tights/bike shorts  3-24-17 pt to increase pump use to 2x/daily. Pt has bandaging supplies and will try to continue with this to bring volume down.  Ultimately she will need to purchase new pantyhose to accommodate volume decrease.       Procedures:      Proper skin care  Exercise for lymphatic return  Education   Compressive wrapping Manual lymphatic drainage  ADL Training   Soft tissue mobilization Wound management   Other    Frequency:  1x/wk    Certification Date:    4-27-17 to 5-27-17       At the time of each visit a thorough assessment of the patient is completed to assure appropriateness of our plan of care.  The dressings or modalities may need to be adapted from the original plan to address any significant changes in the environment.    Clinician Signature:______________________________Date:__________________      Physician Signature:_____________________________Date:__________________

## 2017-05-25 ENCOUNTER — OFFICE VISIT (OUTPATIENT)
Dept: BEHAVIORAL HEALTH | Facility: PHYSICIAN GROUP | Age: 37
End: 2017-05-25
Payer: MEDICARE

## 2017-05-25 ENCOUNTER — APPOINTMENT (OUTPATIENT)
Dept: OTHER | Facility: MEDICAL CENTER | Age: 37
End: 2017-05-25
Payer: MEDICARE

## 2017-05-25 DIAGNOSIS — F41.9 ANXIETY: ICD-10-CM

## 2017-05-25 DIAGNOSIS — F32.9 MAJOR DEPRESSIVE DISORDER WITH SINGLE EPISODE, REMISSION STATUS UNSPECIFIED: ICD-10-CM

## 2017-05-25 PROCEDURE — 1036F TOBACCO NON-USER: CPT | Performed by: SOCIAL WORKER

## 2017-05-25 PROCEDURE — 90791 PSYCH DIAGNOSTIC EVALUATION: CPT | Performed by: SOCIAL WORKER

## 2017-05-25 PROCEDURE — G8432 DEP SCR NOT DOC, RNG: HCPCS | Performed by: SOCIAL WORKER
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
## 2017-05-30 ENCOUNTER — OFFICE VISIT (OUTPATIENT)
Dept: MEDICAL GROUP | Facility: MEDICAL CENTER | Age: 37
End: 2017-05-30
Payer: MEDICARE

## 2017-05-30 VITALS
TEMPERATURE: 99.2 F | OXYGEN SATURATION: 96 % | WEIGHT: 169 LBS | HEART RATE: 92 BPM | BODY MASS INDEX: 28.16 KG/M2 | HEIGHT: 65 IN | DIASTOLIC BLOOD PRESSURE: 70 MMHG | SYSTOLIC BLOOD PRESSURE: 112 MMHG | RESPIRATION RATE: 16 BRPM

## 2017-05-30 DIAGNOSIS — Z13.21 ENCOUNTER FOR VITAMIN DEFICIENCY SCREENING: ICD-10-CM

## 2017-05-30 DIAGNOSIS — E89.0 POSTOPERATIVE HYPOTHYROIDISM: ICD-10-CM

## 2017-05-30 PROBLEM — Z87.891 HISTORY OF TOBACCO ABUSE: Status: ACTIVE | Noted: 2017-05-18

## 2017-05-30 PROCEDURE — G8419 CALC BMI OUT NRM PARAM NOF/U: HCPCS | Performed by: NURSE PRACTITIONER

## 2017-05-30 PROCEDURE — 99213 OFFICE O/P EST LOW 20 MIN: CPT | Mod: 25 | Performed by: NURSE PRACTITIONER

## 2017-05-30 PROCEDURE — 1036F TOBACCO NON-USER: CPT | Performed by: NURSE PRACTITIONER

## 2017-05-30 RX ORDER — LEVOTHYROXINE SODIUM 0.15 MG/1
150 TABLET ORAL
Qty: 90 TAB | Refills: 1 | Status: SHIPPED | OUTPATIENT
Start: 2017-05-30 | End: 2017-11-13 | Stop reason: SDUPTHER

## 2017-05-30 ASSESSMENT — PATIENT HEALTH QUESTIONNAIRE - PHQ9: CLINICAL INTERPRETATION OF PHQ2 SCORE: 2

## 2017-05-30 NOTE — MR AVS SNAPSHOT
"Vanda Cortez   2017 4:20 PM   Office Visit   MRN: [id number]    Department:  [address] [place] [address]   Dept Phone:  [phone]    Description:  Female : 1980   Provider:  MARTA Perla           Reason for Visit     Follow-Up 3 months    Finger Injury right middle finger stiches    Medication Refill levothyroxin      Allergies as of 2017     Allergen Noted Reactions    Latex 2016   Rash    Morphine 2016   Rash    Sulfa Drugs 2016   Itching    Skin starts to slough of    Sulfamethoxazole-Trimethoprim 2016   Rash      You were diagnosed with     Encounter for vitamin deficiency screening   [542654]       Postoperative hypothyroidism   [710459]         Vital Signs     Blood Pressure Pulse Temperature Respirations Height Weight    112/70 mmHg 92 37.3 °C (99.2 °F) 16 1.651 m (5' 5\") 76.658 kg (169 lb)    Body Mass Index Oxygen Saturation Smoking Status             28.12 kg/m2 96% Former Smoker         Basic Information     Date Of Birth Sex Race Ethnicity Preferred Language    1980 Female White Non- English      Your appointments     May 31, 2017  1:00 PM   Lymphedema 60 Minute Follow Up with Sasha Suarez P.T.   [facility] ([address] [place] [address])    [address]  [place] [address]   [phone]            2017  2:00 PM   Smoking Cessation Consult with [facility] EXAM 5   [facility]  (--)    [address]  [place] [address]   [phone]            2017  1:00 PM   Lymphedema 60 Minute Follow Up with Sasha Suarez P.T.   [facility] ([address] [place] [address])    [address]  [place] [address]   [phone]            2017  3:00 PM   Individual Therapy with Umm Galdamez L.C.S.W.   [facility] [address] [facility] ([facility])    [address]  [address]  [place] [address]   [phone]            2017  3:00 PM   Individual Therapy with Umm Galdamez" OLENA   [facility] [address] [facility] ([facility])    [address] [facility] [address]  [address]  [place] [address]   [phone]            Aug 03, 2017  3:00 PM   Individual Therapy with Umm Galdamez L.C.S.W.   BEHAVIORAL HEALTH [address] [facility] ([facility])    [address] [facility]  [address]  [place] [address]   [phone]            Nov 30, 2017  1:20 PM   Established Patient with MARTA Perla   [facility] [address]    [address]  [place] [address]   [phone]           You will be receiving a confirmation call a few days before your appointment from our automated call confirmation system.              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 Review all of your home medications and newly ordered medications with your provider and/or pharmacist. Follow medication instructions as directed by your provider and/or pharmacist. Please keep your medication list with you and share with your provider. Update the information when medications are discontinued, doses are changed, or new medications (including over-the-counter products) are added; and carry medication information at all times in the event of emergency situations      Allergies:  LATEX - Rash     MORPHINE - Rash     SULFA DRUGS - Itching     SULFAMETHOXAZOLE-TRIMETHOPRIM - Rash               Medications  Valid as of: May 30, 2017 -  5:08 PM    Generic Name Brand Name Tablet Size Instructions for use    Azithromycin (Tab) ZITHROMAX 250 MG Take as directed        Escitalopram Oxalate (Tab) LEXAPRO 20 MG Take 20 mg by mouth every day.        Ibuprofen (Tab) MOTRIN 800 MG Take 1 Tab by mouth every 8 hours as needed (with food.).        Levothyroxine Sodium (Tab) SYNTHROID 150 MCG Take 150 mcg by mouth Every morning on an empty stomach.        Nicotine (PATCH 24 HR) NICODERM 14 MG/24HR Apply 1 Patch to skin as directed every 24 hours.        .                 Medicines prescribed today were sent to:     [id number] - [place], [place] - [address] [place] [address]    Phone: [phone] Fax: [phone]    Open 24 Hours?: No      Medication refill instructions:       If your prescription bottle indicates you have medication refills left, it is not necessary to call your provider’s office. Please contact your pharmacy and they will refill your medication.    If your prescription bottle indicates you do not have any refills left, you may request refills at any time through one of the following ways: The online [employer] system (except Urgent Care), by calling your provider’s office, or by asking your pharmacy to contact your provider’s office with a refill request. Medication refills are processed only during regular business hours and may not be available until the next business day. Your provider may request additional information or to have a follow-up visit with you prior to refilling your medication.   *Please Note: Medication refills are assigned a new Rx number when refilled electronically. Your pharmacy may indicate that no refills were authorized even though a new prescription for the same medication is available at the pharmacy. Please request  the medicine by name with the pharmacy before contacting your provider for a refill.        Your To Do List     Future Labs/Procedures Complete By Expires    TSH  As directed 5/31/2018    VITAMIN D,25 HYDROXY  As directed 5/31/2018         MyChart Access Code: Activation code not generated  Current [email or web address] Status: Active

## 2017-05-30 NOTE — Clinical Note
[facility]  MARTA Perla  [address]  [place] [address]  Fax: [phone]   Authorization for Release/Disclosure of   Protected Health Information   Name: VANDA SHEPPARD : 1980 SSN: [id number]   [place] [place] [facility] [address] Phone:    [phone] (home)    I authorize the entity listed below to release/disclose the PHI below to:   [facility]/MARTA Perla and MARTA Perla   Provider or Entity Name:  Dr. Hirsch- [facility] [place]   Address   City, [facility], [place]   Phone:      Fax:     Reason for request: continuity of care   Information to be released:    [  ] LAST COLONOSCOPY,  including any PATH REPORT and follow-up  [  ] LAST FIT/COLOGUARD RESULT [  ] LAST DEXA  [  ] LAST MAMMOGRAM  [x  ] LAST PAP  [  ] LAST LABS [  ] RETINA EXAM REPORT  [  ] IMMUNIZATION RECORDS  [  ] Release all info      [  ] Check here and initial the line next to each item to release ALL health information INCLUDING  _____ Care and treatment for drug and / or alcohol abuse  _____ HIV testing, infection status, or AIDS  _____ Genetic Testing    DATES OF SERVICE OR TIME PERIOD TO BE DISCLOSED: _____________  I understand and acknowledge that:  * This Authorization may be revoked at any time by you in writing, except if your health information has already been used or disclosed.  * Your health information that will be used or disclosed as a result of you signing this authorization could be re-disclosed by the recipient. If this occurs, your re-disclosed health information may no longer be protected by State or Federal laws.  * You may refuse to sign this Authorization. Your refusal will not affect your ability to obtain treatment.  * This Authorization becomes effective upon signing and will  on (date) __________.      If no date is indicated, this Authorization will  one (1) year from the signature date.    Name: Vanda Sheppard    Signature:   Date:     2017            PLEASE FAX REQUESTED RECORDS BACK TO: [phone]

## 2017-05-31 ENCOUNTER — NON-PROVIDER VISIT (OUTPATIENT)
Dept: WOUND CARE | Facility: MEDICAL CENTER | Age: 37
End: 2017-05-31
Attending: NURSE PRACTITIONER
Payer: MEDICARE

## 2017-05-31 PROCEDURE — 97535 SELF CARE MNGMENT TRAINING: CPT

## 2017-05-31 PROCEDURE — 97140 MANUAL THERAPY 1/> REGIONS: CPT

## 2017-05-31 NOTE — WOUND TEAM
Advanced Wound Care  [facility]  [address]  [address]  [place] [address]  [phone] Fax: [phone]    Lymphedema PROGRESS Note  For Certification Period: 4-27-17 to 5-27-17  visit  G-code  C-code  kx modifier     #3  5/23/17  8990/8991 CN no                     Referring Physician:NESHA Perla  Primary Physician:  same  Consulting Physicians:    edema(s): R LE  Start of Care:3/14/17  Date of Surgery:  2014  Dates of chemotherapy or radiation: 10/2014    Subjective:        SUBJECTIVE:  4-27-17 - PROGRESS note -- pt wears tights w/ additional layer of leggings for compression as bandaging was difficult and her toe swelled.  She performed massage on her own toe and compression and it improved.  In the past she had a lymphoscintogram of the R thigh.  Has missed several appts due to infection.    HPI:Pt is 37 y/o female s/p melanoma resection on R proximal anterior thigh surgically removed at [facility] in Oct 2014.  Pt reports having at least 9 nodes removed then states she had all nodes removed.  Pt developed a seroma on medial thigh after drain removal and was caring for this on her own until it resolved.  Pt then developed R LE lymphedema and was treated in [place] for CDT at [facility].  Pt was able to reduce leg volume, obtain compression pantyhose and a lymphatic pump.  She also had a surgeon in [place] drain the seroma at one time.  Pt has since moved to [place] and is having an exacerbation of R thigh edema and is concerned the seroma is back.  She is very active and is not able to return to sport due to this. She has purchased bandaging supplies and would like to try this but would like direction so she does it correctly.    Pain: none, just discomfort due to thigh rubbing with all ambulatory activities    Past Medical History:   Past Medical History   Diagnosis Date   • Cancer (CMS-HCC)      melanoma - 2014   • Anxiety    • Hypothyroid    • Depression    • Tobacco abuse  5/18/2017   Current Medications:   Current outpatient prescriptions:   •  levothyroxine (SYNTHROID) 150 MCG Tab, Take 1 Tab by mouth Every morning on an empty stomach., Disp: 90 Tab, Rfl: 1  •  escitalopram (LEXAPRO) 20 MG tablet, Take 20 mg by mouth every day., Disp: , Rfl:   Past Surgical History:   Past Surgical History   Procedure Laterality Date   • Thyroidectomy total       2008   • Wide excision melanoma, leg, w/poss.stsg       2014     Social History:  Social History     Social History   • Marital Status: Single     Spouse Name: N/A   • Number of Children: N/A   • Years of Education: N/A     Occupational History   • Not on file.     Social History Main Topics   • Smoking status: Former Smoker -- 0.25 packs/day for 13 years   • Smokeless tobacco: Former User   • Alcohol Use: No   • Drug Use: No   • Sexual Activity: Not on file     Other Topics Concern   • Not on file     Social History Narrative        Objective:      Tests, Measures and Referrals:  4-27-17 - had lymphoscintogram in past  3/14/17:  Palpable pulses DP, PT R LE    Orthotic, protective, supportive devices:   3-24-17 - discussion re: use of p-hose 1-2 layers, vs bandaging w/ modification.  Suggested bike shorts to maintain position of bandaging.  3-14-17 Pt has 30-40 mmhg compression tights,  Pt uses a lymphatic pump, but it is not flexitouch, once a day in the morning prior to donning compression garment.  Pt does not sleep with any garment    Fall Risk Assessment (lily all that apply with an X):3/14/17completed- not a fall risk    Skin condition:    edema, scars- significant on anterior mid thigh and in inguinal region R , tenderness, , shiny,  hair,sulci,       Edema / Girth measurements:        Lower Extremity Lymphedema Girth Record    Position when measuring:   Standing____   Long-sitting__X__   Supine____      Date: 3/14/17 3-24-17 4/5/17 5/2/17 5/23/17 5/31/17   Location:   R       L R R R Pre/post pump             L         CHINO PEÑALOZA          I  66.8 67 69.7 69.2/ 68.6    H 61.8     55.8 65.6 63 64 67.7/ 63.5 70   G 51        46.8 53.1 52.5 51.7 56.3/ 54.3 53.5   F 38        37 40.5 37.5 38.5 41.6/ 41.8 40.8   E 33.8     31.7 31 33 33.3 32.6/ 32.8 31   D 37.4     35.6 27.4 38 38.2 38.7/ 38.3 37.5   C 27.5     28 29.2 29.5 29 30/ 28.7 29.5   B 19.8     19.8 19.8 19.8 21 20.6/ 20.3 20   A  (foot measurement) 20.5     20.5 20.2 20.5  20.6/ 20.6 20.5     Measurements are in 10cm increments.   Keep the foot / leg relationship as close to 90 degrees as possible.  Measurement A is measured 10cm from base of 3rd toenail.    Measurement B is measured 10cm from the heel up the lateral leg and so on every 10cm.      Procedures performed:  Patient Education:  5/31/17:  Manual 3, functional 1 unit  Measurements:  LE improved, upper thigh larger- increased firmness/fibrosis in this region   Garment:  Discussed henrique david and issues at foot, pt may want to wrap distal end of henrique david at night if edema continues on dorsal foot   MLD:  Complete sequence, using I-I and A-I pathways.  Emphasized self massage proximally when using pump.  Include pubic pumping with pump to not encourage and mobilize fluid away from this region.    Pump:  Written info provided to pt as she is seeing her oncologist tomorrow for notes to qualify for flexi touch pump.  Discussed pubic pumping with this pump.       5/23/17:  pneumatic pump 4, functional 2 unit  Measurements;  Pump:  Trial flexitouch - trunk, thigh calf foot 60 minutes- working with rep regarding switch along with oncologist as pt has failed with entre pump with increased thigh and hip/trunk edema interfering with pt lifestyle and comfort  Garment:  Just received henrique david full leg piece.  Has tried for 4 nights, works well but has fallen down creating upper thigh edema she believes.  Provided with isoband and shown how to bandage thigh, hip and trunk to hold in place or to use bike shorts or similar to hold in place.  Pt does  not need to bandage over this but still recommend using pump prior to use of jovipak.       5/2/17:  Manual 3 units, Functional 1 units  Measurements:  Inconsistently slightly up- pt reports having an [employer] which may have flared her up.    Garments:  U/A to [place] script at [place] so pt is being referred with new script to [facility] for circ aid for thigh and or entire LE. Discussed if she gets just thigh component she will need to bandage LE up to thigh piece.   K tape:  Pt has been enjoying benefits of K tape and 2 pieces from lateral trunk to medial thigh and lateral thigh applied. One more piece over distal scar for tenting effect.    Pump:  Pt wants to only pump at night and is very infrequent.  Still does not know what pump she has, but may be [employer] due to insurance. Pt has classic issues with proximal thigh, hip, pubic edema complicating her issues which is why she is seeking treatment now. She continues to c/o thighs rubbing and irritating with any physical activities and is limiting her which is also psychologically disabling.  Call placed to flexitouch rep to determine if she would qualify for flexitouch now.    MLD:  Complete sequence from SCF to trunk, thigh, LE, foot also utilizing TP and MFR and scar tissue mobilization.      4-27-17 - PROGRESS note - no measurements today, preferred only MLD and deeper soft tissue massage to the R LE and trunk.  Added subclavian veins, then anterior/posterior trunk directing fluid to neck.  Suggested she add self massage to pelvic region to prevent additional swelling at the pubis.  Discussed additional compression of bike shorts along w/ tights and leggings.  She has K-tape on and uses this indep at home on her anterior thigh.  She will also try swimming w/ bike shorts for compression.     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                                                                                         4/5/17:    Functional therapy 4 units  Measurements,  Discussion of bandaging and limitations with falling down, difficulty donning due to hand disability, and bunching causing foot edema when she woke one morning- took 3 days to bring down so now bandaging is not a viable option for her  Seroma- question regarding if it returned upon initial evaluation.  Dr. Schulz in for modified bedside US to determine there is no current seroma.  Pt was offered more formal test but due to insignificant findings today this was not pursued.  Garments:  Discussed option of circaid thigh component with LE bandaging or compression stocking or full leg system. Pt would like to see if insurance can cover this.  It was discussed this likely is not covered but pt was provided with script to [facility] per Dr. RUPA MAGANA tape:  Trial of K tap from R lateral hip to medial mid thigh and another to distal mid thigh incision .  Pt provided with 2 extra pieces and info to order if she deems this is a beneficial therapy for her.  Future:  Continue MLD to determine if this can assist in reduction.  Limited time due to physician intervention and time slot for appt.     3-24-17 -   Functional therapy 60 minutes - began w/ MLD R thigh and pelvis by therapist and demonstrated by pt.  Discussed during that time the followup treatments and pt goals.  Final decisions - continue w/ self massage and use of pump daily.  She wants to stay very active w/ exercises and yoga during the summer.  She wants additional info re: compression w/ bandaging but is very worried re: slippage and discomfort at R thigh.  Suggested that she receive more frequent treatments to change bandaging and compression.  Pt states that she is taking a trip for the summer plus it will be very hot w/ bandages on.  PT suggested that she plan to wrap each morning, and be prepared to change the bandaging at least at the upper thigh, 2-3 x/day until sufficient compression is  available.  Modified the bandage wrap technique w/ the use of supine position on bed to wrap lower leg and foot w/ stockinette, rosidal foam, then comprilan 8x2, 10, 12 over knee in figure eight only over knee.  Then pt stood and began w/ stockinette overlapped lower leg, rosidal foam to inguinals and all scars and lymphedematous areas, comprilan 10 and 12 cm x2 each.  Tubigrip G over total leg after testing firmness to match total leg.  Discussed need to change the bandaging during the day if it slips down into the posterior knee.  We also discussed pt participating in her desired activities when she leaves in 2 weeks, returns, and should continue w/ treatments for the cooler weather.    3-14-17 manual therapy 1 - bandaging. 8,10 ,10, 12,   Initial eval-mod- discussed at length current POC, and recurrent issues.  Explained to pt Dr. Schulz will be notified regarding pt concern of seroma- see below-  4-5-17 Dr. GOODE will US in clinic  Manual therapy:  Pt was shown and bandaged R LE from MTH to superior thigh with tricofix, rosidal foam, comprilan 8, 10 cm x 2, 12 cm.  Pt was shown how to do figure 8 at ankle and knee, as well, but instructed that she does not need to do figure 8 the entire limb.  Also she can try just rosidal foam and does not need large gray foam clam shells that came with her kit.  Pt was provided with spaghetti foam to use in compression pantyhose over area of concern of seroma if she would like to try this for local compression.  Pt to try bandaging at NOC post pump, then daytime garment post am pump  Compression pump- pt uses 1x daily currently, would like to increase time daily but uncertain of brand. Pt did say it is not flexitouch as her insurance and copay was cost prohibitive. It is possible that the pump she is using may be contributing to proximal thigh edema if the leg portion does not come up that high.    Education:  Per above and pt currently should not try inc compression solely  proximally as this could potentially increase distal edema.   Plan    AM pump, compression pantyhose (spaghetti foam),  PM pump, bandaging.  Will discuss seroma issue with Dr. GOODE- see below 4-5-17 US seroma in clinic per Dr. GOODE     ROM: WNL though pt feels limited in hip flexion     Gait:  5/23/17:  Depending on thigh volume does well once reduced  4-27-17 - walks 1 1/2 mi/day  3-24-17 - today noticed limp w/ full bandaging on R LE  3-14-17 WNL    Sensation: intact, with some areas of diminished sensation due to scar tissue     ADL issues:    4-27-17 - PROGRESS note - has started swimming and is trying different compression to wear.  3-14-17 Pt c/o right thigh rubbing with ambulation, biking, hiking. Pt states she has not been able to ski or snowboard since her surgery in .    Wounds issues:  None- significant scar tisue  Mid thigh and inguinal    Professional Collaboration:   5/23/17:  Spoke with Rosa NEVAREZ at [facility] to discuss upcoming appointment with Dr. Crum regarding pt failure with current entre pump and would like Dr. Crum input for transitioning to flexitouch pump which pt has responded well to with upper thigh and trunk component.  4-27-17 - PROGRESS note sent to Antonella Garner  4/5/17:  Dr. GOODE in to assist with seroma US.  No seroma noted today, full assessment not order due to pt able to visualize and verbalize no evidence of residual seroma   3/14/17:  Initial eval completed and sent to referring provider via [facility]  Reviewed case with Dr. Schulz regarding seroma.  Originally pt was asked to return to previous MD who have cared for her regarding the seroma but pt did not want to return to them.  Dr. GOODE states we can look at the seroma with our US in clinic upon an appt with her to determine if it is still present, problematic and/or if any intervention would be recommended.  Dr. GOODE will be notified upon appt on 4-5-17 for in clinic US    Assessment:      Edema etiology: secondary  lymphedema due to melanoma post resection and over 30 nodes removal  In 2014    Rationale for Treatment: reinstate CDT as she has had a flare up and determine areas to improve maintenance phase so she can prevent flare up    Edema Progress:  5/23/17:  Responded very well to trial of flexitouch pump- reduction noted in proximal thigh after 1 hour use.   4-27-17 - PROGRESS - continues to c/o  Swelling at upper inner thigh, using K-tape daily.  4-5-17:  No seroma noted, measurements stable despite LE edema with bandaging issues.   3-24-17 - slight decrease in circumference R LE - possible measurement differences.  3-14-17currently with R thigh flare up with pt concern of recurrence of seroma on medial thigh.  Edema return could also be a result of pump not efficiently clearing entire leg into trunk and fluid could be stagnant above top of sleeve.     Patient tolerance/compliance:   5/23/17:  Trial of flexitouch, pt would like to consider this as her current pump halts and proximal edema is very frustrating to pt ADL and sports activity  4-27-17 - PROGRESS - continues to be frustrated  4-5-17- still frustrated with thigh edema but beginning to understand she may not be able to return to L LE volume.  3-24-17 - continues to be anxious re: all aspects of treatment but left w/ slightly elevated emotions   3-14-17Pt emotional and really would like to reduce LE volume so she can return to sport     Complicating factors: questionable seroma on medial thigh- ? Pump efficacy clearing proximal thigh    Need for ongoing Advanced lymphedema services:  Pt. Requires skilled therapuetic management of lymphedema via complete decongestive therapy including compression, exercise, MLD and other therapy.       LTG (4wks) Met (m)    PT/CG will be independent with performance of manual lymphatic drainage techniques. m   PT/CG will perform exercises to facilitate lymphatic drainage independently. m   PT/CG will demonstrate compressive  wrapping techniques with min assistance and verbal cues. learning   Pain will decrease to 0 /10.    PT/CG will be independent with care of compressive wraps. m   Girth volume will decrease by 3-4 cm ongoing   PT/CG will obtain proper compressive garments. ongoing   PT/CG will use compressive wraps and garments on an appropriate schedule. Not appropriate   PT/CG will understand the importance of consistent lymphedema management. m   ROM - increase R hip flexion   endurance - able to perform any activities desired   m  m   GOALS met - 6/10 for 4-27-17    NEW GOALS -   1) ongoing decrease in circumference R LE  2) decrease pain to 0/10  3) assess use of all compression garments               Patient Goals: independent management of edema(s)    History: mod  Exam of body systems:mod   Clinical Presentation:mod   Complexity:mod  Plan:        Dressings / Supply Considerations:  5/2/17: script for circaid and looking into switching to flexitouch  4-27-17 - PROGRESS - changing from bandaging (uncomfortable) to leggings/tights/bike shorts  3-24-17 pt to increase pump use to 2x/daily. Pt has bandaging supplies and will try to continue with this to bring volume down.  Ultimately she will need to purchase new pantyhose to accommodate volume decrease.       Procedures:      Proper skin care  Exercise for lymphatic return  Education   Compressive wrapping Manual lymphatic drainage  ADL Training   Soft tissue mobilization Wound management   Other    Frequency:  1x/wk    Certification Date:    4-27-17 to 5-27-17       At the time of each visit a thorough assessment of the patient is completed to assure appropriateness of our plan of care.  The dressings or modalities may need to be adapted from the original plan to address any significant changes in the environment.    Clinician Signature:______________________________Date:__________________      Physician Signature:_____________________________Date:__________________

## 2017-05-31 NOTE — PROGRESS NOTES
"Subjective:     Chief Complaint   Patient presents with   • Follow-Up     3 months   • Finger Injury     right middle finger stiches   • Medication Refill     levothyroxin     Vanda Cortez is a 36 y.o. female here today for evaluation and management of:    Postoperative hypothyroidism  Stable. Currently taking levothyroxine 150 mcg daily as prescribed.  Denies palpitations, skin changes, temperature intolerance, or changes in bowel habits               Current medicines (including changes today)  Current Outpatient Prescriptions   Medication Sig Dispense Refill   • levothyroxine (SYNTHROID) 150 MCG Tab Take 1 Tab by mouth Every morning on an empty stomach. 90 Tab 1   • escitalopram (LEXAPRO) 20 MG tablet Take 20 mg by mouth every day.       No current facility-administered medications for this visit.     She  has a past medical history of Cancer (CMS-HCC); Anxiety; Hypothyroid; Depression; and Tobacco abuse (5/18/2017).    HM: pap record requested. Had done in December 2016  ROS   Constitutional: Negative for fever, chills/sweats   Respiratory: Negative for shortness of breath, DEXTER  Cardiovascular: Negative for chest pain or pressure  GI/: Negative for diarrhea/constipation / urinary difficulty  All other systems reviewed and are negative except as per HPI.          Objective:     Blood pressure 112/70, pulse 92, temperature 37.3 °C (99.2 °F), resp. rate 16, height 1.651 m (5' 5\"), weight 76.658 kg (169 lb), SpO2 96 %. Body mass index is 28.12 kg/(m^2).   Physical Exam:  Alert, oriented in no acute distress.  Eye contact is good, speech goal directed, affect calm  HEENT: conjunctiva non-injected, sclera non-icteric.  Pinna normal. TM pearly gray.   Oral mucous membranes pink and moist with no lesions.  Neck No adenopathy or masses in the neck or supraclavicular regions.  Lungs: clear to auscultation bilaterally with good excursion.  CV: regular rate and rhythm.  Ext: no edema, color normal, vascularity normal, " temperature normal  Psych: Alert and oriented x3, normal affect and mood.  Skin: Warm, dry, good turgor, no rashes in visible areas.      Assessment and Plan:   The following treatment plan was discussed   1. Encounter for vitamin deficiency screening  VITAMIN D,25 HYDROXY    Would like screening for vitamin D deficiency. Advised her insurance may not cover and she should check.   2. Postoperative hypothyroidism  TSH    levothyroxine (SYNTHROID) 150 MCG Tab    Chronic and stable. Feels euthyroid. Continue dose as prescribed. Refill done. Labs ordered. Last labs were within normal range.           Followup: Return in about 6 months (around 11/30/2017). or sooner for new symptoms or should new problems arise      This dictation was created using voice recognition software. The accuracy of the dictation is limited to the abilities of the software. I expect there may be some errors of grammar and possibly content. The MA notes were reviewed and certain aspects of this information were incorporated into this note.

## 2017-05-31 NOTE — PROGRESS NOTES
"Chief Complaint   Patient presents with   • Follow-Up     3 months   • Finger Injury     right middle finger stiches   • Medication Refill     levothyroxin     Here for suture removal.  One week ago cut right middle finger while slicing watermelon. Two sutres in placed  No redness, heat, pain, or drainage from incision.  Blood pressure 112/70, pulse 92, temperature 37.3 °C (99.2 °F), resp. rate 16, height 1.651 m (5' 5\"), weight 76.658 kg (169 lb), SpO2 96 %.  Skin is healed.   Two sutures removed and mastasol and steristips are placed.  Advised emollient as needed, avoidance of peroxide and antibiotic ointment to reduce irritation. Cautioned regarding increased sun sensitivity in healing skin.         "

## 2017-06-14 ENCOUNTER — APPOINTMENT (OUTPATIENT)
Dept: WOUND CARE | Facility: MEDICAL CENTER | Age: 37
End: 2017-06-14
Attending: NURSE PRACTITIONER
Payer: MEDICARE

## 2017-06-21 ENCOUNTER — NON-PROVIDER VISIT (OUTPATIENT)
Dept: WOUND CARE | Facility: MEDICAL CENTER | Age: 37
End: 2017-06-21
Attending: NURSE PRACTITIONER
Payer: MEDICARE

## 2017-06-21 PROCEDURE — 97140 MANUAL THERAPY 1/> REGIONS: CPT

## 2017-06-21 NOTE — CERTIFICATION
Advanced Wound Care  [facility]  [address]  [address]  [place] [address]  [phone] Fax: [phone]    Lymphedema update Note  For Certification Period: 5-27-17 to 6-27-17  visit  G-code  C-code  kx modifier     #5  6/21/17  8990/8991 CN no                     Referring Physician:NESHA Perla  Primary Physician:  same  Consulting Physicians:    edema(s): R LE  Start of Care:3/14/17  Date of Surgery:  2014  Dates of chemotherapy or radiation: 10/2014    Subjective:            HPI:Pt is 37 y/o female s/p melanoma resection on R proximal anterior thigh surgically removed at [facility] in Oct 2014.  Pt reports having at least 9 nodes removed then states she had all nodes removed.  Pt developed a seroma on medial thigh after drain removal and was caring for this on her own until it resolved.  Pt then developed R LE lymphedema and was treated in [place] for CDT at [facility].  Pt was able to reduce leg volume, obtain compression pantyhose and a lymphatic pump.  She also had a surgeon in [place] drain the seroma at one time.  Pt has since moved to [place] and is having an exacerbation of R thigh edema and is concerned the seroma is back.  She is very active and is not able to return to sport due to this. She has purchased bandaging supplies and would like to try this but would like direction so she does it correctly.    Pain: none, just discomfort due to thigh rubbing with all ambulatory activities    Past Medical History:   Past Medical History   Diagnosis Date   • Cancer (CMS-HCC)      melanoma - 2014   • Anxiety    • Hypothyroid    • Depression    • Tobacco abuse 5/18/2017   Current Medications:   Current outpatient prescriptions:   •  levothyroxine (SYNTHROID) 150 MCG Tab, Take 1 Tab by mouth Every morning on an empty stomach., Disp: 90 Tab, Rfl: 1  •  escitalopram (LEXAPRO) 20 MG tablet, Take 20 mg by mouth every day., Disp: , Rfl:   Past Surgical History:   Past Surgical History    Procedure Laterality Date   • Thyroidectomy total       2008   • Wide excision melanoma, leg, w/poss.stsg       2014     Social History:  Social History     Social History   • Marital Status: Single     Spouse Name: N/A   • Number of Children: N/A   • Years of Education: N/A     Occupational History   • Not on file.     Social History Main Topics   • Smoking status: Former Smoker -- 0.25 packs/day for 13 years   • Smokeless tobacco: Former User   • Alcohol Use: No   • Drug Use: No   • Sexual Activity: Not on file     Other Topics Concern   • Not on file     Social History Narrative        Objective:      Tests, Measures and Referrals:  4-27-17 - had lymphoscintogram in past  3/14/17:  Palpable pulses DP, PT R LE    Orthotic, protective, supportive devices:   3-24-17 - discussion re: use of p-hose 1-2 layers, vs bandaging w/ modification.  Suggested bike shorts to maintain position of bandaging.  3-14-17 Pt has 30-40 mmhg compression tights,  Pt uses a lymphatic pump, but it is not flexitouch, once a day in the morning prior to donning compression garment.  Pt does not sleep with any garment    Fall Risk Assessment (lily all that apply with an X):3/14/17completed- not a fall risk    Skin condition:    edema, scars- significant on anterior mid thigh and in inguinal region R , tenderness, , shiny,  hair,sulci,       Edema / Girth measurements:        Lower Extremity Lymphedema Girth Record    Position when measuring:   Standing____   Long-sitting__X__   Supine____      Date: 3/14/17 3-24-17 4/5/17 5/2/17 5/23/17 5/31/17 6/21/17   Location:   R       L R R R Pre/post pump               L          K          J          I  66.8 67 69.7 69.2/ 68.6     H 61.8     55.8 65.6 63 64 67.7/ 63.5 70 69   G 51        46.8 53.1 52.5 51.7 56.3/ 54.3 53.5 53.5   F 38        37 40.5 37.5 38.5 41.6/ 41.8 40.8 39.3   E 33.8     31.7 31 33 33.3 32.6/ 32.8 31 33.2   D 37.4     35.6 27.4 38 38.2 38.7/ 38.3 37.5 37.8   C 27.5     28 29.2  29.5 29 30/ 28.7 29.5 27.5   B 19.8     19.8 19.8 19.8 21 20.6/ 20.3 20 19.8   A  (foot measurement) 20.5     20.5 20.2 20.5  20.6/ 20.6 20.5 20.2     Measurements are in 10cm increments.   Keep the foot / leg relationship as close to 90 degrees as possible.  Measurement A is measured 10cm from base of 3rd toenail.    Measurement B is measured 10cm from the heel up the lateral leg and so on every 10cm.      Procedures performed:  Patient Education:  6/21/17:  Pt met with Tracee from [employer] last week - pump is on order  Manual 3 units  Measurements:  LE relatively stable despite heat and inability to wear henrique at [facility]  MLD to R LE using R A-I and I-I , much softening in calf and ankle,  Proximal knee, along scar softens but still firm.  Pump- should arrive this week and get trained. Pt eager to get started on this  Future:  Pt has approximately 1 more visit and will return in 2-3 weeks after using pump to compare measurements and determine efficacy      5/31/17:  Manual 3, functional 1 unit  Measurements:  LE improved, upper thigh larger- increased firmness/fibrosis in this region   Garment:  Discussed henrique david and issues at foot, pt may want to wrap distal end of henrique david at night if edema continues on dorsal foot   MLD:  Complete sequence, using I-I and A-I pathways.  Emphasized self massage proximally when using pump.  Include pubic pumping with pump to not encourage and mobilize fluid away from this region.    Pump:  Written info provided to pt as she is seeing her oncologist tomorrow for notes to qualify for flexi touch pump.  Discussed pubic pumping with this pump.       5/23/17:  pneumatic pump 4, functional 2 unit  Measurements;  Pump:  Trial flexitouch - trunk, thigh calf foot 60 minutes- working with rep regarding switch along with oncologist as pt has failed with entre pump with increased thigh and hip/trunk edema interfering with pt lifestyle and comfort  Garment:  Just received henrique david full leg  piece.  Has tried for 4 nights, works well but has fallen down creating upper thigh edema she believes.  Provided with isoband and shown how to bandage thigh, hip and trunk to hold in place or to use bike shorts or similar to hold in place.  Pt does not need to bandage over this but still recommend using pump prior to use of jovipak.       5/2/17:  Manual 3 units, Functional 1 units  Measurements:  Inconsistently slightly up- pt reports having an [employer] which may have flared her up.    Garments:  U/A to fill script at [facility] so pt is being referred with new script to [facility] for circ aid for thigh and or entire LE. Discussed if she gets just thigh component she will need to bandage LE up to thigh piece.   K tape:  Pt has been enjoying benefits of K tape and 2 pieces from lateral trunk to medial thigh and lateral thigh applied. One more piece over distal scar for tenting effect.    Pump:  Pt wants to only pump at night and is very infrequent.  Still does not know what pump she has, but may be [employer] due to insurance. Pt has classic issues with proximal thigh, hip, pubic edema complicating her issues which is why she is seeking treatment now. She continues to c/o thighs rubbing and irritating with any physical activities and is limiting her which is also psychologically disabling.  Call placed to flexitouch rep to determine if she would qualify for flexitouch now.    MLD:  Complete sequence from SCF to trunk, thigh, LE, foot also utilizing TP and MFR and scar tissue mobilization.      4-27-17 - PROGRESS note - no measurements today, preferred only MLD and deeper soft tissue massage to the R LE and trunk.  Added subclavian veins, then anterior/posterior trunk directing fluid to neck.  Suggested she add self massage to pelvic region to prevent additional swelling at the pubis.  Discussed additional compression of bike shorts along w/ tights and leggings.  She has K-tape on and uses this indep at home on her  anterior thigh.  She will also try swimming w/ bike shorts for compression.     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                                                                                        4/5/17:    Functional therapy 4 units  Measurements,  Discussion of bandaging and limitations with falling down, difficulty donning due to hand disability, and bunching causing foot edema when she woke one morning- took 3 days to bring down so now bandaging is not a viable option for her  Seroma- question regarding if it returned upon initial evaluation.  Dr. Schulz in for modified bedside US to determine there is no current seroma.  Pt was offered more formal test but due to insignificant findings today this was not pursued.  Garments:  Discussed option of circaid thigh component with LE bandaging or compression stocking or full leg system. Pt would like to see if insurance can cover this.  It was discussed this likely is not covered but pt was provided with script to [facility] per Dr. RUPA MAGANA tape:  Trial of K tap from R lateral hip to medial mid thigh and another to distal mid thigh incision .  Pt provided with 2 extra pieces and info to order if she deems this is a beneficial therapy for her.  Future:  Continue MLD to determine if this can assist in reduction.  Limited time due to physician intervention and time slot for appt.     3-24-17 -   Functional therapy 60 minutes - began w/ MLD R thigh and pelvis by therapist and demonstrated by pt.  Discussed during that time the followup treatments and pt goals.  Final decisions - continue w/ self massage and use of pump daily.  She wants to stay very active w/ exercises and yoga during the summer.  She wants additional info re: compression w/ bandaging but is very worried re: slippage and discomfort at R thigh.  Suggested that she receive more frequent treatments to change bandaging and compression.  Pt  states that she is taking a trip for the summer plus it will be very hot w/ bandages on.  PT suggested that she plan to wrap each morning, and be prepared to change the bandaging at least at the upper thigh, 2-3 x/day until sufficient compression is available.  Modified the bandage wrap technique w/ the use of supine position on bed to wrap lower leg and foot w/ stockinette, rosidal foam, then comprilan 8x2, 10, 12 over knee in figure eight only over knee.  Then pt stood and began w/ stockinette overlapped lower leg, rosidal foam to inguinals and all scars and lymphedematous areas, comprilan 10 and 12 cm x2 each.  Tubigrip G over total leg after testing firmness to match total leg.  Discussed need to change the bandaging during the day if it slips down into the posterior knee.  We also discussed pt participating in her desired activities when she leaves in 2 weeks, returns, and should continue w/ treatments for the cooler weather.    3-14-17 manual therapy 1 - bandaging. 8,10 ,10, 12,   Initial eval-mod- discussed at length current POC, and recurrent issues.  Explained to pt Dr. Schulz will be notified regarding pt concern of seroma- see below-  4-5-17 Dr. GOODE will US in clinic  Manual therapy:  Pt was shown and bandaged R LE from MTH to superior thigh with tricofix, rosidal foam, comprilan 8, 10 cm x 2, 12 cm.  Pt was shown how to do figure 8 at ankle and knee, as well, but instructed that she does not need to do figure 8 the entire limb.  Also she can try just rosidal foam and does not need large gray foam clam shells that came with her kit.  Pt was provided with spaghetti foam to use in compression pantyhose over area of concern of seroma if she would like to try this for local compression.  Pt to try bandaging at NOC post pump, then daytime garment post am pump  Compression pump- pt uses 1x daily currently, would like to increase time daily but uncertain of brand. Pt did say it is not flexitouch as her insurance  and copay was cost prohibitive. It is possible that the pump she is using may be contributing to proximal thigh edema if the leg portion does not come up that high.    Education:  Per above and pt currently should not try inc compression solely proximally as this could potentially increase distal edema.   Plan    AM pump, compression pantyhose (spaghetti foam),  PM pump, bandaging.  Will discuss seroma issue with Dr. GOODE- see below 4-5-17 US seroma in clinic per Dr. GOODE     ROM: WNL though pt feels limited in hip flexion     Gait:    5/23/17:  Depending on thigh volume does well once reduced  4-27-17 - walks 1 1/2 mi/day  3-24-17 - today noticed limp w/ full bandaging on R LE  3-14-17 WNL    Sensation: intact, with some areas of diminished sensation due to scar tissue     ADL issues:    6/21/17:  Pt independent with all but still frustrated as she wants to return to sports but feels it is in the way and heat makes it worse.   4-27-17 - PROGRESS note - has started swimming and is trying different compression to wear.  3-14-17 Pt c/o right thigh rubbing with ambulation, biking, hiking. Pt states she has not been able to ski or snowboard since her surgery in .    Wounds issues:  None- significant scar tisue  Mid thigh and inguinal    Professional Collaboration:     Patricia from [employer] ordered pump and performed demo here at clinic 2 weeks ago    Assessment:      Edema etiology: secondary lymphedema due to melanoma post resection and over 30 nodes removal  In 2014    Rationale for Treatment: reinstate CDT as she has had a flare up and determine areas to improve maintenance phase so she can prevent flare up    Edema Progress:  5/23/17:  Responded very well to trial of flexitouch pump- reduction noted in proximal thigh after 1 hour use.   4-27-17 - PROGRESS - continues to c/o  Swelling at upper inner thigh, using K-tape daily.  4-5-17:  No seroma noted, measurements stable despite LE edema with bandaging issues.    3-24-17 - slight decrease in circumference R LE - possible measurement differences.  3-14-17currently with R thigh flare up with pt concern of recurrence of seroma on medial thigh.  Edema return could also be a result of pump not efficiently clearing entire leg into trunk and fluid could be stagnant above top of sleeve.     Patient tolerance/compliance:   6/21/17:  Stable but eager to begin use of flexitouch with hopes to reduce thigh more.   Complicating factors: currently heat, still with entree    Need for ongoing Advanced lymphedema services:  Pt. Requires skilled therapuetic management of lymphedema via complete decongestive therapy including compression, exercise, MLD and other therapy.       LTG (4wks) Met (m)    PT/CG will be independent with performance of manual lymphatic drainage techniques. m   PT/CG will perform exercises to facilitate lymphatic drainage independently. m   PT/CG will demonstrate compressive wrapping techniques with min assistance and verbal cues. learning   Pain will decrease to 0 /10.    PT/CG will be independent with care of compressive wraps. m   Girth volume will decrease by 3-4 cm ongoing   PT/CG will obtain proper compressive garments. ongoing   PT/CG will use compressive wraps and garments on an appropriate schedule. Not appropriate   PT/CG will understand the importance of consistent lymphedema management. m   ROM - increase R hip flexion   endurance - able to perform any activities desired   m  m   GOALS    NEW GOALS -   1) ongoing decrease in circumference R LE  2) decrease pain to 0/10  3) assess use of all compression garments               Patient Goals: independent management of edema(s)    History: mod  Exam of body systems:mod   Clinical Presentation:mod   Complexity:mod  Plan:        Dressings / Supply Considerations:  5/2/17: script for circaid and looking into switching to flexitouch  4-27-17 - PROGRESS - changing from bandaging (uncomfortable) to  leggings/tights/bike shorts  3-24-17 pt to increase pump use to 2x/daily. Pt has bandaging supplies and will try to continue with this to bring volume down.  Ultimately she will need to purchase new pantyhose to accommodate volume decrease.       Procedures:      Proper skin care  Exercise for lymphatic return  Education   Compressive wrapping Manual lymphatic drainage  ADL Training   Soft tissue mobilization Wound management   Other    Frequency:  1x/wk           At the time of each visit a thorough assessment of the patient is completed to assure appropriateness of our plan of care.  The dressings or modalities may need to be adapted from the original plan to address any significant changes in the environment.    Clinician Signature:______________________________Date:__________________      Physician Signature:_____________________________Date:__________________

## 2017-07-03 RX ORDER — ESCITALOPRAM OXALATE 20 MG/1
20 TABLET ORAL DAILY
Qty: 30 TAB | Refills: 0 | Status: SHIPPED | OUTPATIENT
Start: 2017-07-03 | End: 2017-08-04 | Stop reason: SDUPTHER

## 2017-07-06 ENCOUNTER — APPOINTMENT (OUTPATIENT)
Dept: BEHAVIORAL HEALTH | Facility: PHYSICIAN GROUP | Age: 37
End: 2017-07-06
Payer: MEDICARE

## 2017-07-11 ENCOUNTER — APPOINTMENT (OUTPATIENT)
Dept: WOUND CARE | Facility: MEDICAL CENTER | Age: 37
End: 2017-07-11
Attending: NURSE PRACTITIONER
Payer: MEDICARE

## 2017-07-14 ENCOUNTER — OFFICE VISIT (OUTPATIENT)
Dept: URGENT CARE | Facility: CLINIC | Age: 37
End: 2017-07-14
Payer: MEDICARE

## 2017-07-14 VITALS
OXYGEN SATURATION: 99 % | BODY MASS INDEX: 28.16 KG/M2 | WEIGHT: 169 LBS | RESPIRATION RATE: 16 BRPM | DIASTOLIC BLOOD PRESSURE: 74 MMHG | HEIGHT: 65 IN | SYSTOLIC BLOOD PRESSURE: 134 MMHG | HEART RATE: 72 BPM | TEMPERATURE: 99.7 F

## 2017-07-14 DIAGNOSIS — T24.202A: ICD-10-CM

## 2017-07-14 PROCEDURE — 99214 OFFICE O/P EST MOD 30 MIN: CPT | Performed by: NURSE PRACTITIONER

## 2017-07-14 RX ORDER — OXYCODONE HYDROCHLORIDE AND ACETAMINOPHEN 5; 325 MG/1; MG/1
1-2 TABLET ORAL EVERY 6 HOURS PRN
Qty: 12 TAB | Refills: 0 | Status: SHIPPED | OUTPATIENT
Start: 2017-07-14 | End: 2017-11-13

## 2017-07-14 ASSESSMENT — ENCOUNTER SYMPTOMS
CHILLS: 0
FEVER: 0
MYALGIAS: 0
NAUSEA: 0
HEADACHES: 0
VOMITING: 0
BURN: 1
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
## 2017-07-15 NOTE — PATIENT INSTRUCTIONS
Burn Care  Your skin is a natural barrier to infection. It is the largest organ of your body. Burns damage this natural protection. To help prevent infection, it is very important to follow your caregiver's instructions in the care of your burn.  Burns are classified as:  · First degree. There is only redness of the skin (erythema). No scarring is expected.  · Second degree. There is blistering of the skin. Scarring may occur with deeper burns.  · Third degree. All layers of the skin are injured, and scarring is expected.  HOME CARE INSTRUCTIONS   · Wash your hands well before changing your bandage.  · Change your bandage as often as directed by your caregiver.  ¨ Remove the old bandage. If the bandage sticks, you may soak it off with cool, clean water.  ¨ Cleanse the burn thoroughly but gently with mild soap and water.  ¨ Pat the area dry with a clean, dry cloth.  ¨ Apply a thin layer of antibacterial cream to the burn.  ¨ Apply a clean bandage as instructed by your caregiver.  ¨ Keep the bandage as clean and dry as possible.  · Elevate the affected area for the first 24 hours, then as instructed by your caregiver.  · Only take over-the-counter or prescription medicines for pain, discomfort, or fever as directed by your caregiver.  SEEK IMMEDIATE MEDICAL CARE IF:   · You develop excessive pain.  · You develop redness, tenderness, swelling, or red streaks near the burn.  · The burned area develops yellowish-white fluid (pus) or a bad smell.  · You have a fever.  MAKE SURE YOU:   · Understand these instructions.  · Will watch your condition.  · Will get help right away if you are not doing well or get worse.     This information is not intended to replace advice given to you by your health care provider. Make sure you discuss any questions you have with your health care provider.     Document Released: 12/18/2006 Document Revised: 03/11/2013 Document Reviewed: 05/09/2012  [employer] Interactive Patient Education ©2016  [employer]
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
## 2017-07-20 ENCOUNTER — OFFICE VISIT (OUTPATIENT)
Dept: BEHAVIORAL HEALTH | Facility: PHYSICIAN GROUP | Age: 37
End: 2017-07-20
Payer: MEDICARE

## 2017-07-20 DIAGNOSIS — F33.1 MODERATE EPISODE OF RECURRENT MAJOR DEPRESSIVE DISORDER (HCC): ICD-10-CM

## 2017-07-20 PROCEDURE — 90834 PSYTX W PT 45 MINUTES: CPT | Performed by: SOCIAL WORKER

## 2017-07-20 NOTE — BH THERAPY
[facility]  Therapy Progress Note    Patient Name: Vanda Cortez  Patient MRN: [id number]  Today's Date: 7/20/2017     Type of session:Individual psychotherapy  Length of session: 45 minutes  Persons in attendance:Patient    Subjective/New Info: patient reports feeling depressed in the past month. 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 She will use mindfulness in the next two weeks to cope with her stressors. She has been referred to inpatient treatment for her tobacco use which is is willing to self pay for the services. She is also going to contact voc rehab. She has been educated about the provider change and has agreed to switch therapist next month     Objective/Observations:   Participation: Active verbal participation, Attentive and Engaged   Grooming: Casual and Neat   Cognition: Alert   Eye contact: Good   Mood: Depressed   Affect: Congruent with content   Thought process: Logical   Speech: Rate within normal limits and Volume within normal limits   Other:     Diagnoses:   1. Moderate episode of recurrent major depressive disorder (CMS-HCC)         Current risk:   SUICIDE: Not applicable   Homicide: Not applicable   Self-harm: Not applicable   Relapse: Not applicable   Other:    Safety Plan reviewed? Not Indicated   If evidence of imminent risk is present, intervention/plan:     Therapeutic Intervention(s): Goal-setting, Problem-solving and Relaxation exercise    Treatment Goal(s)/Objective(s) addressed:    Reported feeling more positive about self and abilities    Learn to successfully challenge and change distortions in thinking.     Identifying Maladaptive Thoughts/Beliefs Challenging  Maladaptive Thoughts/Beliefs    Utilized skills to encourage mood and emotional suffering more effectively.     Progress toward Treatment Goals: Moderate improvement    Plan:  - Next appointment scheduled:  8/3/2017    Umm Galdamez L.C.S.W.  7/20/2017

## 2017-08-04 RX ORDER — ESCITALOPRAM OXALATE 20 MG/1
20 TABLET ORAL DAILY
Qty: 90 TAB | Refills: 0 | Status: SHIPPED | OUTPATIENT
Start: 2017-08-04 | End: 2017-11-13 | Stop reason: SDUPTHER

## 2017-09-29 NOTE — WOUND TEAM
visit  G-code  C-code  kx modifier     1 on 3/14/17  8990/[id number] cn    PT visit note of this date reviewed and determined the above to be correct g code and c modifier for this visit date

## 2017-10-05 ENCOUNTER — APPOINTMENT (OUTPATIENT)
Dept: RADIOLOGY | Facility: IMAGING CENTER | Age: 37
End: 2017-10-05
Attending: PHYSICIAN ASSISTANT
Payer: MEDICARE

## 2017-10-05 ENCOUNTER — OFFICE VISIT (OUTPATIENT)
Dept: URGENT CARE | Facility: CLINIC | Age: 37
End: 2017-10-05
Payer: MEDICARE

## 2017-10-05 VITALS
OXYGEN SATURATION: 99 % | WEIGHT: 176 LBS | SYSTOLIC BLOOD PRESSURE: 110 MMHG | TEMPERATURE: 99.7 F | BODY MASS INDEX: 29.32 KG/M2 | DIASTOLIC BLOOD PRESSURE: 74 MMHG | HEIGHT: 65 IN | HEART RATE: 90 BPM | RESPIRATION RATE: 16 BRPM

## 2017-10-05 DIAGNOSIS — R22.32 LUMP OF LEFT WRIST: ICD-10-CM

## 2017-10-05 DIAGNOSIS — M67.432 GANGLION CYST OF WRIST, LEFT: ICD-10-CM

## 2017-10-05 PROCEDURE — 73110 X-RAY EXAM OF WRIST: CPT | Mod: TC,LT | Performed by: PHYSICIAN ASSISTANT

## 2017-10-05 PROCEDURE — 99214 OFFICE O/P EST MOD 30 MIN: CPT | Performed by: PHYSICIAN ASSISTANT

## 2017-10-05 ASSESSMENT — ENCOUNTER SYMPTOMS
FOCAL WEAKNESS: 0
CHILLS: 0
NAUSEA: 0
TINGLING: 0
FEVER: 0
SENSORY CHANGE: 0

## 2017-10-05 ASSESSMENT — PAIN SCALES - GENERAL: PAINLEVEL: 2=MINIMAL-SLIGHT

## 2017-10-05 NOTE — PROGRESS NOTES
"Subjective:      Vanda Cortez is a 37 y.o. female who presents with Arm Pain (L wrist pain m4xcxal, noticed bump on wrist x3days)            The patient is here today with left wrist pain that she has had for about 1 month. She states she noticed a small lump about 3 days ago on the dorsal aspect of her wrist.  She became extremely afraid due to her history. She has a hx of thyroid cancer and melanoma. She is concerned her lump may be cancer and is requesting a bone x-ray.   She reports some pain extends up her forearm. She denies numbness or tingling. The patient denies recent injury.      Past Medical History:   Diagnosis Date   • Tobacco abuse 5/18/2017   • Anxiety    • Cancer (CMS-HCC)     melanoma - 2014   • Depression    • Hypothyroid      Past Surgical History:   Procedure Laterality Date   • THYROIDECTOMY TOTAL      2008   • WIDE EXCISION MELANOMA, LEG, W/POSS.STSG      2014       Family History   Problem Relation Age of Onset   • Cancer Father      Squamous cell carcinoma   • Cancer Paternal Grandmother      Squamous cell carcinoma   • Cancer Paternal Grandfather      Squamous cell carcinoma       Allergies   Allergen Reactions   • Latex Rash   • Morphine Rash   • Sulfa Drugs Itching     Skin starts to slough of   • Sulfamethoxazole-Trimethoprim Rash       Medications, Allergies, and current problem list reviewed today in Epic      Review of Systems   Constitutional: Negative for chills, fever and malaise/fatigue.   Gastrointestinal: Negative for nausea.   Musculoskeletal: Positive for joint pain (left wrist pain with small lump).   Skin: Negative for rash.   Neurological: Negative for tingling, sensory change and focal weakness.     All other systems reviewed and are negative.        Objective:     /74   Pulse 90   Temp 37.6 °C (99.7 °F)   Resp 16   Ht 1.651 m (5' 5\")   Wt 79.8 kg (176 lb)   SpO2 99%   BMI 29.29 kg/m²      Physical Exam   Constitutional: She is oriented to person, place, and " time. She appears well-developed and well-nourished. No distress.   Pulmonary/Chest: Effort normal. No respiratory distress.   Musculoskeletal:        Left wrist: She exhibits tenderness and bony tenderness. She exhibits normal range of motion.        Arms:  Neurological: She is alert and oriented to person, place, and time. No cranial nerve deficit.   Psychiatric: She has a normal mood and affect. Her behavior is normal. Judgment and thought content normal.           10/5/2017 3:37 PM    HISTORY/REASON FOR EXAM:  Soft tissue mass..      TECHNIQUE/EXAM DESCRIPTION AND NUMBER OF VIEWS:  4 views of the LEFT wrist.    COMPARISON: None    FINDINGS:  No acute fracture of the left wrist is identified.  No cortical bone destruction.  No soft tissue calcifications identified.  Posterior soft tissue mass is not delineated.   Impression       No acute fracture of the left wrist identified. If findings remain of concern, ultrasound and/or MRI would be consideration for the evaluation.            Assessment/Plan:     1. Ganglion cyst of wrist, left  DX-WRIST-COMPLETE 3+ LEFT    REFERRAL TO ORTHOPEDICS           Encouraged RICE.  Encouraged OTC NSAID.  Orthopedic referral.     Differential diagnoses, Supportive care, and indications for immediate follow-up discussed with patient.   Instructed to return to clinic or nearest emergency department for any change in condition, further concerns, or worsening of symptoms.    The patient demonstrated a good understanding and agreed with the treatment plan.    Taisha Ayon P.A.-C.

## 2017-10-07 ENCOUNTER — OFFICE VISIT (OUTPATIENT)
Dept: URGENT CARE | Facility: CLINIC | Age: 37
End: 2017-10-07
Payer: MEDICARE

## 2017-10-07 VITALS
HEIGHT: 65 IN | TEMPERATURE: 99.8 F | DIASTOLIC BLOOD PRESSURE: 70 MMHG | BODY MASS INDEX: 28.32 KG/M2 | SYSTOLIC BLOOD PRESSURE: 112 MMHG | RESPIRATION RATE: 16 BRPM | HEART RATE: 83 BPM | OXYGEN SATURATION: 98 % | WEIGHT: 170 LBS

## 2017-10-07 DIAGNOSIS — F41.8 SITUATIONAL ANXIETY: ICD-10-CM

## 2017-10-07 DIAGNOSIS — M67.432 GANGLION CYST OF WRIST, LEFT: ICD-10-CM

## 2017-10-07 PROCEDURE — 99202 OFFICE O/P NEW SF 15 MIN: CPT | Performed by: EMERGENCY MEDICINE

## 2017-10-07 ASSESSMENT — ENCOUNTER SYMPTOMS
FOCAL WEAKNESS: 0
NUMBNESS: 0
FEVER: 0
TINGLING: 0
SENSORY CHANGE: 0
JOINT SWELLING: 0

## 2017-10-08 NOTE — PROGRESS NOTES
Subjective:      Vanda Cortez is a 37 y.o. female who presents with Cyst (on left wrist)            Other   This is a new problem. The current episode started in the past 7 days. The problem has been unchanged. Pertinent negatives include no fever, joint swelling, numbness or rash. The symptoms are aggravated by bending. Treatments tried: wrist splint. The treatment provided no relief.   Patient notes new diagnosis of left wrist ganglion cyst. Notes considerable concern because of prior melanoma problem. Notes no specific trauma, but has had left wrist and forearm discomfort associated with increased activity. Prior x-rays unremarkable, no significant improvement with wrist splint. Was provided referral for orthopedics, but has experienced with specific hand surgeon because of right hand surgery in the past. Notes prior issues of anxiety, intolerance of benzodiazepines and antihistamines; states that her best method of addressing anxiety is by directly fixing the problem. Requesting ganglion aspiration for reassurance.    Review of Systems   Constitutional: Negative for fever.   Musculoskeletal: Positive for joint pain. Negative for joint swelling.   Skin: Negative for rash.   Neurological: Negative for tingling, sensory change, focal weakness and numbness.     PMH:  has a past medical history of Anxiety; Cancer (CMS-HCC); Depression; Hypothyroid; and Tobacco abuse (5/18/2017).  MEDS:   Current Outpatient Prescriptions:   •  escitalopram (LEXAPRO) 20 MG tablet, Take 1 Tab by mouth every day., Disp: 90 Tab, Rfl: 0  •  levothyroxine (SYNTHROID) 150 MCG Tab, Take 1 Tab by mouth Every morning on an empty stomach., Disp: 90 Tab, Rfl: 1  •  oxycodone-acetaminophen (PERCOCET) 5-325 MG Tab, Take 1-2 Tabs by mouth every 6 hours as needed. (Patient not taking: Reported on 10/5/2017), Disp: 12 Tab, Rfl: 0  ALLERGIES:   Allergies   Allergen Reactions   • Latex Rash   • Morphine Rash   • Sulfa Drugs Itching     Skin starts to  "slough of   • Sulfamethoxazole-Trimethoprim Rash     SURGHX:   Past Surgical History:   Procedure Laterality Date   • THYROIDECTOMY TOTAL      2008   • WIDE EXCISION MELANOMA, LEG, W/POSS.STSG      2014     SOCHX:  reports that she has quit smoking. She has a 3.25 pack-year smoking history. She has quit using smokeless tobacco. She reports that she does not drink alcohol or use drugs.  FH: family history includes Cancer in her father, paternal grandfather, and paternal grandmother.       Objective:     /70   Pulse 83   Temp 37.7 °C (99.8 °F)   Resp 16   Ht 1.651 m (5' 5\")   Wt 77.1 kg (170 lb)   LMP 09/07/2017   SpO2 98%   Breastfeeding? No   BMI 28.29 kg/m²      Physical Exam   Constitutional: Vital signs are normal. She appears well-developed and well-nourished. She is cooperative. She does not have a sickly appearance. She does not appear ill.   Cardiovascular:   Pulses:       Radial pulses are 2+ on the left side.   Capillary refill is normal.   Musculoskeletal:        Left wrist: She exhibits normal range of motion, no tenderness, no bony tenderness, no effusion and no crepitus.        Arms:  Lymphadenopathy:   No proximal lymphangitis   Neurological: She is alert.   Distal sensation to light touch and pressure intact.   Skin: Skin is warm, dry and intact. No bruising, no ecchymosis, no petechiae and no rash noted.   Psychiatric: Her speech is normal and behavior is normal. Thought content normal. Her mood appears anxious. Her affect is not inappropriate.               Assessment/Plan:     1. Ganglion cyst of wrist, left  Confirmed diagnosis, provided alternative wrist splint.  Advised ice, OTC NSAID when necessary pain.  - REFERRAL TO HAND SURGERY    2. Situational anxiety  Provided reassurance that this was not suspect for melanoma.  "

## 2017-10-23 ENCOUNTER — HOSPITAL ENCOUNTER (OUTPATIENT)
Dept: LAB | Facility: MEDICAL CENTER | Age: 37
End: 2017-10-23
Attending: SURGERY
Payer: MEDICARE

## 2017-10-23 LAB
ALBUMIN SERPL BCP-MCNC: 4.3 G/DL (ref 3.2–4.9)
ALBUMIN/GLOB SERPL: 1.3 G/DL
ALP SERPL-CCNC: 36 U/L (ref 30–99)
ALT SERPL-CCNC: 11 U/L (ref 2–50)
ANION GAP SERPL CALC-SCNC: 6 MMOL/L (ref 0–11.9)
AST SERPL-CCNC: 18 U/L (ref 12–45)
BILIRUB SERPL-MCNC: 0.4 MG/DL (ref 0.1–1.5)
BUN SERPL-MCNC: 19 MG/DL (ref 8–22)
CALCIUM SERPL-MCNC: 9.6 MG/DL (ref 8.5–10.5)
CHLORIDE SERPL-SCNC: 106 MMOL/L (ref 96–112)
CO2 SERPL-SCNC: 26 MMOL/L (ref 20–33)
CREAT SERPL-MCNC: 0.75 MG/DL (ref 0.5–1.4)
GFR SERPL CREATININE-BSD FRML MDRD: >60 ML/MIN/1.73 M 2
GLOBULIN SER CALC-MCNC: 3.2 G/DL (ref 1.9–3.5)
GLUCOSE SERPL-MCNC: 77 MG/DL (ref 65–99)
POTASSIUM SERPL-SCNC: 4.1 MMOL/L (ref 3.6–5.5)
PROT SERPL-MCNC: 7.5 G/DL (ref 6–8.2)
SODIUM SERPL-SCNC: 138 MMOL/L (ref 135–145)

## 2017-10-23 PROCEDURE — 80053 COMPREHEN METABOLIC PANEL: CPT

## 2017-10-23 PROCEDURE — 36415 COLL VENOUS BLD VENIPUNCTURE: CPT

## 2017-11-02 ENCOUNTER — OFFICE VISIT (OUTPATIENT)
Dept: MEDICAL GROUP | Facility: CLINIC | Age: 37
End: 2017-11-02
Payer: MEDICARE

## 2017-11-02 VITALS
HEART RATE: 100 BPM | TEMPERATURE: 98.5 F | WEIGHT: 170 LBS | SYSTOLIC BLOOD PRESSURE: 112 MMHG | RESPIRATION RATE: 14 BRPM | OXYGEN SATURATION: 98 % | HEIGHT: 66 IN | DIASTOLIC BLOOD PRESSURE: 60 MMHG | BODY MASS INDEX: 27.32 KG/M2

## 2017-11-02 DIAGNOSIS — R10.11 RIGHT UPPER QUADRANT ABDOMINAL PAIN: ICD-10-CM

## 2017-11-02 PROCEDURE — 99214 OFFICE O/P EST MOD 30 MIN: CPT | Performed by: FAMILY MEDICINE

## 2017-11-02 NOTE — PROGRESS NOTES
CC: Right upper quadrant discomfort    HPI:    The patient is a 37-year-old white female with a history of melanoma and anxiety who comes in today with a complaint of sensations of gurgling and extreme discomfort in the right upper quadrant of her abdomen. This has been going on for almost a week. It occurs after eating. It started after she ate 8 coconut cookies. The pain is relieved by Pepto-Bismol. She denies nausea or vomiting. She denies change in bowel or bladder habits except that when she takes the Pepto-Bismol she gets dark stools. She denies unintended weight loss.  Denies chest pain, shortness of breath, headache, dizziness.      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 (Patient not taking: Reported on 10/5/2017), Disp: 12 Tab, Rfl: 0    Allergies as of 11/02/2017 - Reviewed 11/02/2017   Allergen Reaction Noted   • Latex Rash 11/14/2016   • Morphine Rash 11/14/2016   • Sulfa drugs Itching 11/14/2016   • Sulfamethoxazole-trimethoprim Rash 11/14/2016       Social History     Social History   • Marital status: Single     Spouse name: N/A   • Number of children: N/A   • Years of education: N/A     Occupational History   • Not on file.     Social History Main Topics   • Smoking status: Former Smoker     Packs/day: 0.25     Years: 13.00  "  • Smokeless tobacco: Former User   • Alcohol use No   • Drug use: No   • Sexual activity: Not on file     Other Topics Concern   • Not on file     Social History Narrative   • No narrative on file       Family History   Problem Relation Age of Onset   • Cancer Father      Squamous cell carcinoma   • Cancer Paternal Grandmother      Squamous cell carcinoma   • Cancer Paternal Grandfather      Squamous cell carcinoma       Past Medical History:   Diagnosis Date   • Anxiety    • Cancer (CMS-HCC)     melanoma - 2014   • Depression    • Hypothyroid    • Tobacco abuse 5/18/2017       Past Surgical History:   Procedure Laterality Date   • THYROIDECTOMY TOTAL      2008   • WIDE EXCISION MELANOMA, LEG, W/POSS.STSG      2014       ROS:  As documented above in the history of present illness.    /60   Pulse 100   Temp 36.9 °C (98.5 °F)   Resp 14   Ht 1.676 m (5' 6\")   Wt 77.1 kg (170 lb)   LMP 10/08/2017   SpO2 98%   Breastfeeding? No   BMI 27.44 kg/m²     Physical Exam:      GEN:  Alert, oriented, Anxious  HEENT;  PERRLA, EOMI  NECK;  Supple without adenopathy   LUNGS:  Clear to auscultation  CV:  Heart RRR without murmurs or S3 or S4  ABD:  Soft, nontender, nondistended, normal bowel sounds. No guarding or rebound.  No hepatosplenomegaly.  EXT:  Without cyanosis, clubbing, or edema.  NEURO:  Cranial nerves II through XII intact.  Motor function and sensation intact.      Assessment and Plan:     1. Right upper quadrant abdominal pain    - H.PYLORI STOOL ANTIGEN; Future  - US-GALLBLADDER; Future  -She had a recent CMP that was normal. I suggested a CBC but she gets extreme anxiety when getting blood work so we will defer this for now.  -She had bought some omeprazole but has not started taking it. I suggested that she could start taking it once a day which could help rule out or rule in acid reflux as an etiology.      Follow-up with PCP in 2 weeks or when necessary.      Total of 25 minutes face-to-face time " was spent with patient, with greater than 50% of the time spent in counseling and coordination of care.          Please note that this dictation was created using voice recognition software. I have worked with consultants from the vendor as well as technical experts from [facility] to optimize the interface. I have made every reasonable attempt to correct obvious errors, but I expect there are errors of rita and possibly content that I did not discover before finalizing the note.

## 2017-11-09 ENCOUNTER — HOSPITAL ENCOUNTER (OUTPATIENT)
Facility: MEDICAL CENTER | Age: 37
End: 2017-11-09
Attending: FAMILY MEDICINE
Payer: MEDICARE

## 2017-11-09 DIAGNOSIS — R10.11 RIGHT UPPER QUADRANT ABDOMINAL PAIN: ICD-10-CM

## 2017-11-09 LAB — H PYLORI AG STL QL IA: NOT DETECTED

## 2017-11-09 PROCEDURE — 87338 HPYLORI STOOL AG IA: CPT

## 2017-11-13 ENCOUNTER — OFFICE VISIT (OUTPATIENT)
Dept: MEDICAL GROUP | Facility: MEDICAL CENTER | Age: 37
End: 2017-11-13
Payer: MEDICARE

## 2017-11-13 VITALS
WEIGHT: 174.82 LBS | BODY MASS INDEX: 28.1 KG/M2 | HEIGHT: 66 IN | RESPIRATION RATE: 16 BRPM | SYSTOLIC BLOOD PRESSURE: 100 MMHG | OXYGEN SATURATION: 97 % | TEMPERATURE: 100.1 F | DIASTOLIC BLOOD PRESSURE: 56 MMHG | HEART RATE: 100 BPM

## 2017-11-13 DIAGNOSIS — F33.1 MODERATE EPISODE OF RECURRENT MAJOR DEPRESSIVE DISORDER (HCC): ICD-10-CM

## 2017-11-13 DIAGNOSIS — C43.71 MALIGNANT MELANOMA OF RIGHT LOWER EXTREMITY INCLUDING HIP (HCC): ICD-10-CM

## 2017-11-13 DIAGNOSIS — M25.522 ELBOW PAIN, LEFT: ICD-10-CM

## 2017-11-13 DIAGNOSIS — M67.40 GANGLION CYST: ICD-10-CM

## 2017-11-13 DIAGNOSIS — E89.0 POSTOPERATIVE HYPOTHYROIDISM: ICD-10-CM

## 2017-11-13 DIAGNOSIS — F41.9 ANXIETY: ICD-10-CM

## 2017-11-13 DIAGNOSIS — I89.0 LYMPHEDEMA: ICD-10-CM

## 2017-11-13 PROBLEM — Z87.891 HISTORY OF TOBACCO ABUSE: Status: RESOLVED | Noted: 2017-05-18 | Resolved: 2017-11-13

## 2017-11-13 PROCEDURE — 99214 OFFICE O/P EST MOD 30 MIN: CPT | Performed by: NURSE PRACTITIONER

## 2017-11-13 RX ORDER — ESCITALOPRAM OXALATE 20 MG/1
20 TABLET ORAL DAILY
Qty: 90 TAB | Refills: 3 | Status: SHIPPED | OUTPATIENT
Start: 2017-11-13 | End: 2018-08-27 | Stop reason: SDUPTHER

## 2017-11-13 RX ORDER — LEVOTHYROXINE SODIUM 0.15 MG/1
150 TABLET ORAL
Qty: 90 TAB | Refills: 3 | Status: SHIPPED | OUTPATIENT
Start: 2017-11-13 | End: 2018-08-27 | Stop reason: SDUPTHER

## 2017-11-13 RX ORDER — TRAMADOL HYDROCHLORIDE 50 MG/1
50 TABLET ORAL EVERY 4 HOURS PRN
Qty: 30 TAB | Refills: 0 | Status: SHIPPED | OUTPATIENT
Start: 2017-11-13 | End: 2018-06-12

## 2017-11-13 ASSESSMENT — PAIN SCALES - GENERAL: PAINLEVEL: 3=SLIGHT PAIN

## 2017-11-13 NOTE — PROGRESS NOTES
CC: elbow pain      Vanda Cortez is a 37 y.o. female here to establish care and to discuss the evaluation and management of:    1. Moderate episode of recurrent major depressive disorder (CMS-HCC)  2. Anxiety  Patient states that she takes her Lexapro daily. Denies any suicidal thoughts or ideations. States that she had gone to behavioral health however states that the provider she was seeing is no longer there and she does not want to reestablish with another therapist at this time.    3. Postoperative hypothyroidism  Patient continues to take her thyroid medication daily.     4. Malignant melanoma of right lower extremity including hip (CMS-HCC)  5. Lymphedema  Patient states she has a history of melanoma on her right leg which she is still seeing an oncologist now every 8 months, and a dermatologist now every 6 months. However still goes down to [place] to help get treatment on her lymphedema in her right thigh.    6. Ganglion cyst  Patient states that approximately 2 months ago noticed a little spot on her wrist that was tender and causes pain she was told that it was a ganglion cyst. She's tried using a brace to help relieve pain that which she reports moderate effectiveness. States she can't use Motrin as it causes her stomach to be upset even when she eats food. Tylenol ineffective for pain control.    7. Elbow pain, left  New. Patient states that she has had some left elbow pain for approximately 2 months. States she might have overused it while doing some cleaning. States sometimes it's hard for her to lift her arm up, and notices that she can't fully extend it and occasionally feels some shooting pain in the inside of her elbow.      ROS:  Denies any Headache, Blurred Vision, Confusion Chest pain,  Shortness of breath,  Abdominal pain, Changes of bowel or bladder, Fevers, Nights sweats, Weight Changes  All other systems are negative.      Current Outpatient Prescriptions:   •  escitalopram (LEXAPRO)  "20 MG tablet, Take 1 Tab by mouth every day., Disp: 90 Tab, Rfl: 3  •  levothyroxine (SYNTHROID) 150 MCG Tab, Take 1 Tab by mouth Every morning on an empty stomach., Disp: 90 Tab, Rfl: 3  •  tramadol (ULTRAM) 50 MG Tab, Take 1 Tab by mouth every four hours as needed., Disp: 30 Tab, Rfl: 0    Allergies   Allergen Reactions   • Latex Rash   • Morphine Rash   • Sulfa Drugs Itching     Skin starts to slough of   • Sulfamethoxazole-Trimethoprim Rash     Bactrum        Past Medical History:   Diagnosis Date   • Anxiety    • Cancer (CMS-HCC)     melanoma - 2014   • 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   • Depression    • History of tobacco abuse 5/18/2017   • Hypothyroid    • Tobacco abuse 5/18/2017     Past Surgical History:   Procedure Laterality Date   • THYROIDECTOMY TOTAL      2008   • WIDE EXCISION MELANOMA, LEG, W/POSS.STSG      2014     Family History   Problem Relation Age of Onset   • Cancer Father      Squamous cell carcinoma   • Cancer Paternal Grandmother      Squamous cell carcinoma   • Cancer Paternal Grandfather      Squamous cell carcinoma     Social History     Social History   • Marital status: Single     Spouse name: N/A   • Number of children: N/A   • Years of education: N/A     Occupational History   • Not on file.     Social History Main Topics   • Smoking status: Former Smoker     Packs/day: 0.25     Years: 13.00   • Smokeless tobacco: Former User   • Alcohol use No   • Drug use: No   • Sexual activity: Yes     Partners: Male     Other Topics Concern   • Not on file     Social History Narrative   • No narrative on file       Objective:     Vitals: /56   Pulse 100   Temp 37.8 °C (100.1 °F)   Resp 16   Ht 1.676 m (5' 6\")   Wt 79.3 kg (174 lb 13.2 oz)   LMP 11/13/2017   SpO2 97%   Breastfeeding? No   BMI 28.22 kg/m²      General: Alert, pleasant, NAD  HEENT:  Normocephalic.  PERRL,no icterus or pallor.  Conjunctivae and lids normal.  External ears " normal. Tympanic membranes pearly, opaque.  Nares patent, mucosa pink.  Oropharynx non-erythematous, mucous membranes moist.  Neck supple.  No thyromegaly or masses palpated. No cervical or supraclavicular lymphadenopathy.  Heart:  Regular rate and rhythm.  S1 and S2 normal.  No murmurs appreciated..    Respiratory:  Normal respiratory effort.  Clear to auscultation bilaterally.    Abdomen:  Bowel sounds present.  Non-distended, soft, non-tender, no guarding/rebound. No hepatosplenomegaly.  No hernias.  Skin:  Warm, dry, no rashes  Musculoskeletal:  Gait is normal.  Moves all extremities well. Right hand with deformity-lack of metacarpals. Patient experiences mild pain when testing strength of left upper extremity, no obvious erythema, crepitus, or edema noted in left elbow. Unable to fully extend elbow.  Extremities:  Pedal pulses 2+ symmetric. No leg edema.  Neurological: No tremors, sensation grossly intact, patellar reflexes 2+ symmetric, tone/strength normal, gait is normal  Psych:  Affect/mood is normal, judgement is good, memory is intact, grooming is appropriate.      Assessment and Plan.   37 y.o. female to establish care and discuss the following    1. Moderate episode of recurrent major depressive disorder (CMS-HCC)  2. Anxiety  Stable. At some point needs to get back into behavioral health when she is ready to establish with a new provider. Denies any suicidal thoughts or ideations at this time. Continue taking Lexapro daily.  - escitalopram (LEXAPRO) 20 MG tablet; Take 1 Tab by mouth every day.  Dispense: 90 Tab; Refill: 3  - VITAMIN D,25 HYDROXY; Future    3. Postoperative hypothyroidism  Stable. Needs TSH lab. Continue taking levothyroxine on an empty stomach prior to meal in a.m.  - levothyroxine (SYNTHROID) 150 MCG Tab; Take 1 Tab by mouth Every morning on an empty stomach.  Dispense: 90 Tab; Refill: 3  - VITAMIN D,25 HYDROXY; Future  - TSH; Future    4. Malignant melanoma of right lower extremity  including hip (CMS-HCC)  5. Lymphedema  In remission. Continues to be followed up by oncology every 8 months, and dermatology every 6 months, and the surgeon who removed a melanoma in [place] -Dr. Hoffmann for lymphedema treatment. Continue recommended follow-ups.    6. Ganglion cyst  Stable. Faint presence of cyst noted on dorsal side of left wrist. Continue using supportive brace, rest and elevation. Discussed that this is can resolve on its own and is not a surgical need at this time.    7. Elbow pain, left  Not well controlled at this time. Patient states that she is unable to take anti-inflammatories as they upset her stomach. Discussed proper sleeping techniques as to avoid laying on that side curling arm up, discussed elevating her arm and her using heat or ice on her elbow. Possibly related to overuse or bursitis. Discussed with patient that we can follow up in 1 month if symptoms haven't improved then we'll proceed with diagnostics patient agreeable.  - tramadol (ULTRAM) 50 MG Tab; Take 1 Tab by mouth every four hours as needed.  Dispense: 30 Tab; Refill: 0      Return in about 1 month (around 12/13/2017), or elbow pain.          Carla BELTRAN.

## 2017-11-13 NOTE — LETTER
[facility]  RUPA Warren.P.R.N.  [address]  [place] [address]  Fax: [phone]   Authorization for Release/Disclosure of   Protected Health Information   Name: VANDA SHEPPARD : 1980 SSN: [id number]   Address: [address] [place], [place] [address] [address] Phone:    [phone] (home)    I authorize the entity listed below to release/disclose the PHI below to:   [facility]/Carla Mendez A.P.R.N. and RUPA Warren.P.R.N.   Provider or Entity Name:  Dr. Hirsch   Address   City, [facility]   Phone:      Fax:     Reason for request: continuity of care   Information to be released:    [  ] LAST COLONOSCOPY,  including any PATH REPORT and follow-up  [  ] LAST FIT/COLOGUARD RESULT [  ] LAST DEXA  [  ] LAST MAMMOGRAM  [x  ] LAST PAP  [  ] LAST LABS [  ] RETINA EXAM REPORT  [  ] IMMUNIZATION RECORDS  [  ] Release all info      [  ] Check here and initial the line next to each item to release ALL health information INCLUDING  _____ Care and treatment for drug and / or alcohol abuse  _____ HIV testing, infection status, or AIDS  _____ Genetic Testing    DATES OF SERVICE OR TIME PERIOD TO BE DISCLOSED: _____________  I understand and acknowledge that:  * This Authorization may be revoked at any time by you in writing, except if your health information has already been used or disclosed.  * Your health information that will be used or disclosed as a result of you signing this authorization could be re-disclosed by the recipient. If this occurs, your re-disclosed health information may no longer be protected by State or Federal laws.  * You may refuse to sign this Authorization. Your refusal will not affect your ability to obtain treatment.  * This Authorization becomes effective upon signing and will  on (date) __________.      If no date is indicated, this Authorization will  one (1) year from the signature date.    Name: Vanda Sheppard    Signature:   Date:          11/13/2017       PLEASE FAX REQUESTED RECORDS BACK TO: [phone]

## 2017-12-10 ENCOUNTER — OFFICE VISIT (OUTPATIENT)
Dept: URGENT CARE | Facility: CLINIC | Age: 37
End: 2017-12-10
Payer: MEDICARE

## 2017-12-10 ENCOUNTER — TELEPHONE (OUTPATIENT)
Dept: MEDICAL GROUP | Facility: MEDICAL CENTER | Age: 37
End: 2017-12-10

## 2017-12-10 VITALS
HEART RATE: 83 BPM | OXYGEN SATURATION: 98 % | TEMPERATURE: 99.1 F | HEIGHT: 66 IN | RESPIRATION RATE: 14 BRPM | DIASTOLIC BLOOD PRESSURE: 62 MMHG | WEIGHT: 170 LBS | BODY MASS INDEX: 27.32 KG/M2 | SYSTOLIC BLOOD PRESSURE: 110 MMHG

## 2017-12-10 DIAGNOSIS — L73.9 FOLLICULITIS: ICD-10-CM

## 2017-12-10 DIAGNOSIS — L03.115 CELLULITIS OF RIGHT LOWER EXTREMITY: Primary | ICD-10-CM

## 2017-12-10 DIAGNOSIS — I89.0 LYMPHEDEMA: ICD-10-CM

## 2017-12-10 PROCEDURE — 99214 OFFICE O/P EST MOD 30 MIN: CPT | Performed by: PHYSICIAN ASSISTANT

## 2017-12-10 RX ORDER — FLUCONAZOLE 150 MG/1
TABLET ORAL
Qty: 2 TAB | Refills: 1 | Status: SHIPPED | OUTPATIENT
Start: 2017-12-10 | End: 2017-12-28

## 2017-12-10 RX ORDER — CLINDAMYCIN HYDROCHLORIDE 300 MG/1
300 CAPSULE ORAL 3 TIMES DAILY
Qty: 21 CAP | Refills: 0 | Status: SHIPPED | OUTPATIENT
Start: 2017-12-10 | End: 2017-12-17

## 2017-12-10 RX ORDER — OXYCODONE HYDROCHLORIDE AND ACETAMINOPHEN 5; 325 MG/1; MG/1
1-2 TABLET ORAL EVERY 4 HOURS PRN
COMMUNITY
End: 2018-02-06

## 2017-12-10 NOTE — TELEPHONE ENCOUNTER
Anastasia parker MD on call  [phone]    Melanoma 3 years ago in R leg s/p excision in remission   lymphedema in R leg for 3 years  She manages this with compression , lymphatic pump  States she has history of MRSA infection       Observed a red bump small where thighs touch L leg, 1-2 weeks ago   She has been applying tea tree oil  And it resolved around yesterday   BUT TODAY, she noticed a red lump in her R Leg where her thighs touch about the size of a pea, full pressure red  No fevers, no pain in R hip joint, no streaking.    She has already called Urgent care and put her name on the waiting list   ddx includes pressure erythema vs early abscess vs cellulitis vs folliculitis  she does need evaluation so urgent care is very appropriate  We also take time to review ER precautions in the interim while she waits for her UC appointment

## 2017-12-11 ASSESSMENT — ENCOUNTER SYMPTOMS
EYES NEGATIVE: 1
GASTROINTESTINAL NEGATIVE: 1
CARDIOVASCULAR NEGATIVE: 1
NEUROLOGICAL NEGATIVE: 1
MUSCULOSKELETAL NEGATIVE: 1
ROS SKIN COMMENTS: BOIL
CONSTITUTIONAL NEGATIVE: 1
PSYCHIATRIC NEGATIVE: 1
RESPIRATORY NEGATIVE: 1

## 2017-12-11 NOTE — PROGRESS NOTES
Subjective:      Vanda Cortez is a 37 y.o. female who presents with Wound Infection ((R) leg)            HPI  Chief Complaint   Patient presents with   • Wound Infection     (R) leg       HPI:  Vanda Cortez is a 37 y.o. female who presents with right leg infection that started yesterday.  HAving pain and lump inside of leg.  Hx of folliculitis of left leg. Right leg with lymphedema due to lymph node dissection due to melanoma.  Concern about infection.  Patient denies HA, SOB, chest pain, palpitations, fever, chills, or n/v/d.      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    Social History     Social History   • Marital status: Single     Spouse name: N/A   • Number of children: N/A   • Years of education: N/A     Occupational History   • Not on file.     Social History Main Topics   • Smoking status: Former Smoker     Packs/day: 0.25     Years: 13.00   • Smokeless tobacco: Former User   • Alcohol use No   • Drug use: No   • Sexual activity: Yes     Partners: Male     Other Topics Concern   • Not on file     Social History Narrative   • No narrative on file         Current Outpatient Prescriptions:   •  oxycodone-acetaminophen, 1-2 Tab, Oral, Q4HRS PRN  •  escitalopram, 20 mg, Oral, DAILY, 12/10/2017  •  levothyroxine, 150 mcg, Oral, AM ES, 12/10/2017  •  tramadol, 50 mg, Oral, Q4HRS PRN    Allergies  "  Allergen Reactions   • Latex Rash   • Morphine Rash   • Sulfa Drugs Itching     Skin starts to slough of   • Sulfamethoxazole-Trimethoprim Rash     Bactrum         Review of Systems   Constitutional: Negative.    HENT: Negative.    Eyes: Negative.    Respiratory: Negative.    Cardiovascular: Negative.    Gastrointestinal: Negative.    Genitourinary: Negative.    Musculoskeletal: Negative.    Skin:        Boil     Neurological: Negative.    Endo/Heme/Allergies: Negative.    Psychiatric/Behavioral: Negative.    All other systems reviewed and are negative.         Objective:     /62   Pulse 83   Temp 37.3 °C (99.1 °F)   Resp 14   Ht 1.664 m (5' 5.5\")   Wt 77.1 kg (170 lb)   LMP 11/13/2017   SpO2 98%   BMI 27.86 kg/m²      Physical Exam       Nursing note and vital signs reviewed.    Constitutional:  Appropriately groomed, pleasant affect, well nourished, and in no acute distress.    HEENT:  Head: Atraumatic, normocephalic.    Eyes:  EOMs full.  Conjunctivae clear, sclera white, and medial canthus without exudate bilaterally.    Ears:  Hearing grossly intact to voice.    Neck:  FROM.  No anterior cervical chain lymphadenopathy. Thyroid nonpalpable, without masses or nodules. No supraclavicular lymphadenopathy to palpation.    Lungs:  Lungs with normal respiratory excursion and effort.      Muscle skeletal:  Gait and station wnl, non antalgic.    Derm:  Palpable firm nodule inside of right thigh 1-2cm in kennedi.  No fluctuation.  Induration present.  Overall good turgor pressure.     Psychiatric:  Normal judgement, mood and affect.          Assessment/Plan:     1. Cellulitis of right lower extremity  fluconazole (DIFLUCAN) 150 MG tablet    clindamycin (CLEOCIN) 300 MG Cap    silver sulfADIAZINE (SILVADENE) 1 % Cream   2. Folliculitis     3. Lymphedema        PAtient presents with infected hair follicle with early evidence of cellulitis with induration and ttp.  Reccommended local warm moist compresses.  " Continue with compression device.  Rx clindamycin and diflucan.  RTC or f/u with PCP if not improving.  At this time no localization of purulent material, no abscess to drain.    Patient was in agreement with this treatment plan and seemed to understand without barriers. All questions were encouraged and answered.  Reviewed signs and symptoms of when to seek emergency medical care.     Please note that this dictation was created using voice recognition software.  I have made every reasonable attempt to correct obvious errors, but I expect there are errors of rita and possibly content that I did not discover before finalizing the note.

## 2017-12-27 ENCOUNTER — HOSPITAL ENCOUNTER (OUTPATIENT)
Dept: LAB | Facility: MEDICAL CENTER | Age: 37
End: 2017-12-27
Attending: NURSE PRACTITIONER
Payer: MEDICARE

## 2017-12-27 DIAGNOSIS — F33.1 MODERATE EPISODE OF RECURRENT MAJOR DEPRESSIVE DISORDER (HCC): ICD-10-CM

## 2017-12-27 DIAGNOSIS — F41.9 ANXIETY: ICD-10-CM

## 2017-12-27 DIAGNOSIS — E89.0 POSTOPERATIVE HYPOTHYROIDISM: ICD-10-CM

## 2017-12-27 LAB — TSH SERPL DL<=0.005 MIU/L-ACNC: 3.13 UIU/ML (ref 0.38–5.33)

## 2017-12-27 PROCEDURE — 36415 COLL VENOUS BLD VENIPUNCTURE: CPT

## 2017-12-27 PROCEDURE — 84443 ASSAY THYROID STIM HORMONE: CPT

## 2017-12-28 ENCOUNTER — OFFICE VISIT (OUTPATIENT)
Dept: MEDICAL GROUP | Facility: MEDICAL CENTER | Age: 37
End: 2017-12-28
Payer: MEDICARE

## 2017-12-28 VITALS
TEMPERATURE: 98.7 F | SYSTOLIC BLOOD PRESSURE: 138 MMHG | WEIGHT: 172 LBS | RESPIRATION RATE: 16 BRPM | DIASTOLIC BLOOD PRESSURE: 74 MMHG | HEART RATE: 100 BPM | BODY MASS INDEX: 27.64 KG/M2 | HEIGHT: 66 IN | OXYGEN SATURATION: 97 %

## 2017-12-28 DIAGNOSIS — I89.0 LYMPHEDEMA: ICD-10-CM

## 2017-12-28 DIAGNOSIS — G47.00 INSOMNIA, UNSPECIFIED TYPE: ICD-10-CM

## 2017-12-28 DIAGNOSIS — E89.0 POSTOPERATIVE HYPOTHYROIDISM: ICD-10-CM

## 2017-12-28 PROCEDURE — 99214 OFFICE O/P EST MOD 30 MIN: CPT | Performed by: NURSE PRACTITIONER

## 2017-12-29 NOTE — PROGRESS NOTES
"cc:  Follow up labs/insomnia    Subjective:     HPI:     Vanda Cortez is a 37 y.o. female here to discuss the evaluation and management of:    1. Hypothyroidism   Patient states that she takes her levothyroxine every morning for this. States she tolerates this well without any side effects. Denies any herpetic palpitations, changes in cold tolerance and/or constipation. States just got her labs done for her TSH.    2. Lymphedema  Patient states that she recently went to the urgent care for an infection in her right leg which is where she had melanoma and subsequently has lymphedema. States this has resolved with the medication she was provided. She follows up with her oncologist and surgeon for treatments frequently in [place]. Recently had CT of abdomen from oncologist which she reports was wihtout any abnormalities in the \"flow of her lymphatic system.\"  Will follow up in June with oncologist.  States that in the future she'll possibly have a liposuction to help reduce the size of her lymphedema in her right leg.    3. Insomnia  Patient states that she suffers from insomnia and has done so since she was in her 20s. States that melatonin and Benadryl do not work for her, states that occasionally doxylamine succinate works. States that she is also tried Ambien and had driven while taking it. States she tries to avoid caffeine, does not drink alcohol, reads at night and does not watch television and her use her electronic device. Saw a new medication on TV which she would like to try called Belsomra.      ROS:  Denies any Headache, Blurred Vision, Confusion Chest pain,  Shortness of breath,  Abdominal pain, Changes of bowel or bladder, Lower ext edema, Fevers, Nights sweats, Weight Changes, Focal weakness or numbness.  All other systems are negative.        Current Outpatient Prescriptions:   •  Suvorexant 10 MG Tab, Take 5 mg by mouth at bedtime as needed for up to 30 days., Disp: 15 Tab, Rfl: 0  •  escitalopram " "(LEXAPRO) 20 MG tablet, Take 1 Tab by mouth every day., Disp: 90 Tab, Rfl: 3  •  levothyroxine (SYNTHROID) 150 MCG Tab, Take 1 Tab by mouth Every morning on an empty stomach., Disp: 90 Tab, Rfl: 3  •  oxycodone-acetaminophen (PERCOCET) 5-325 MG Tab, Take 1-2 Tabs by mouth every four hours as needed., Disp: , Rfl:   •  tramadol (ULTRAM) 50 MG Tab, Take 1 Tab by mouth every four hours as needed., Disp: 30 Tab, Rfl: 0    Allergies   Allergen Reactions   • Latex Rash   • Morphine Rash   • Sulfa Drugs Itching     Skin starts to slough of   • Sulfamethoxazole-Trimethoprim Rash     Bactrum        Past Medical History:   Diagnosis Date   • Anxiety    • Cancer (CMS-HCC)     melanoma - 2014   • 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   • Depression    • History of tobacco abuse 5/18/2017   • Hypothyroid    • Tobacco abuse 5/18/2017     Past Surgical History:   Procedure Laterality Date   • THYROIDECTOMY TOTAL      2008   • WIDE EXCISION MELANOMA, LEG, W/POSS.STSG      2014     Family History   Problem Relation Age of Onset   • Cancer Father      Squamous cell carcinoma   • Cancer Paternal Grandmother      Squamous cell carcinoma   • Cancer Paternal Grandfather      Squamous cell carcinoma     Social History     Social History   • Marital status: Single     Spouse name: N/A   • Number of children: N/A   • Years of education: N/A     Occupational History   • Not on file.     Social History Main Topics   • Smoking status: Former Smoker     Packs/day: 0.25     Years: 13.00   • Smokeless tobacco: Former User   • Alcohol use No   • Drug use: No   • Sexual activity: Yes     Partners: Male     Other Topics Concern   • Not on file     Social History Narrative   • No narrative on file       Objective:     Vitals: /74   Pulse 100   Temp 37.1 °C (98.7 °F)   Resp 16   Ht 1.664 m (5' 5.5\")   Wt 78 kg (172 lb)   SpO2 97%   BMI 28.19 kg/m²    General: Alert, pleasant, NAD  HEENT: Normocephalic. " Neck supple.  No thyromegaly or masses palpated. No cervical or supraclavicular lymphadenopathy.  Heart: Regular rate and rhythm.  S1 and S2 normal.  No murmurs appreciated.  Respiratory: Normal respiratory effort.  Clear to auscultation bilaterally.  Skin: Warm, dry, no rashes.  Musculoskeletal: Gait is normal.  Moves all extremities well. Right hand with deformity and absent first digit.   Extremities:Right leg edema r/t lymphedema, no signs of infection, no erythema noted  Neurological: No tremors, sensation grossly intact  Psych:  Affect/mood is normal, judgement is good, memory is intact, grooming is appropriate.    Assessment/Plan:     Vanda was seen today for follow-up.    Diagnoses and all orders for this visit:    Postoperative hypothyroidism  Stable. Most recent TSH was 3.13. Continue taking levothyroxine 150 µg.    Lymphedema  Chronic. Followed by oncologist and surgeon in [place]. Encouraged patient to continue to frequently monitor for signs and symptoms of infection possibly due to infected hair follicles. Patient states trying to check between thighs every day to ensure no signs of infection/irritation. Follow up with oncologist and surgeon as directed.     Insomnia, unspecified type  Not well controlled at this time. Discussed with patient healthy sleep hygiene habits such as reducing caffeine intake, not taking naps during the day, avoiding electronics before bed, avoiding heavy meals, waking up and going to bed at the same time everyday. Patient has failed to trying other medication such as Ambien, Benadryl and melatonin. Requesting to try new medication.     -     Suvorexant 10 MG Tab; Take 5 mg by mouth at bedtime as needed for up to 30 days.       Health care Maintenance:     Return in about 5 months (around 5/28/2018) for Med Check, Lab results, chronic health.        Carla LOZANO

## 2018-01-03 ENCOUNTER — TELEPHONE (OUTPATIENT)
Dept: MEDICAL GROUP | Facility: MEDICAL CENTER | Age: 38
End: 2018-01-03

## 2018-01-04 NOTE — TELEPHONE ENCOUNTER
DOCUMENTATION OF PAR STATUS:    1. Name of Medication & Dose: Belsomra 10mg     2. Name of Prescription Coverage Company & phone #: [employer]    3. Date Prior Auth Submitted: 01/03/2018    4. What information was given to obtain insurance decision? [email or web address], last office note, previously tried and failed    5. Prior Auth Status? Pending    6. Patient Notified: N\A

## 2018-02-02 NOTE — TELEPHONE ENCOUNTER
FINAL PRIOR AUTHORIZATION STATUS:    1.  Name of Medication & Dose: Belsomra 10mg     2. Prior Auth Status: Approved through 01/04/2019     3. Action Taken: Pharmacy Notified: yes Patient Notified: yes

## 2018-02-06 DIAGNOSIS — G47.00 INSOMNIA, UNSPECIFIED TYPE: ICD-10-CM

## 2018-02-06 RX ORDER — ALPRAZOLAM 0.5 MG/1
0.5 TABLET ORAL NIGHTLY PRN
Qty: 30 TAB | Refills: 0 | Status: SHIPPED
Start: 2018-02-06 | End: 2018-06-12 | Stop reason: SDUPTHER

## 2018-02-21 ENCOUNTER — OFFICE VISIT (OUTPATIENT)
Dept: URGENT CARE | Facility: CLINIC | Age: 38
End: 2018-02-21
Payer: MEDICARE

## 2018-02-21 VITALS
HEIGHT: 65 IN | WEIGHT: 165 LBS | OXYGEN SATURATION: 96 % | DIASTOLIC BLOOD PRESSURE: 68 MMHG | SYSTOLIC BLOOD PRESSURE: 102 MMHG | HEART RATE: 78 BPM | TEMPERATURE: 98.6 F | BODY MASS INDEX: 27.49 KG/M2

## 2018-02-21 DIAGNOSIS — Z48.89 POSTOPERATIVE VISIT: ICD-10-CM

## 2018-02-21 DIAGNOSIS — Z48.02 ENCOUNTER FOR REMOVAL OF SUTURES: ICD-10-CM

## 2018-02-21 PROCEDURE — 99212 OFFICE O/P EST SF 10 MIN: CPT | Performed by: EMERGENCY MEDICINE

## 2018-02-21 ASSESSMENT — ENCOUNTER SYMPTOMS
SENSORY CHANGE: 0
FEVER: 0

## 2018-02-21 NOTE — PROGRESS NOTES
Subjective:      Vanda Cortez is a 37 y.o. female who presents with Suture / Staple Removal (Right leg )            Suture / Staple Removal   Treated in ED: about 3 weeks ago. She tried regular soap and water washings since the wound repair. There is no swelling present. There is no pain present. She has no difficulty moving the affected extremity or digit.   3 single sutured areas right thigh; [place] Scheduled for follow-up. PMH melanoma, chronic right leg lymphedema after lymph node removal.     Review of Systems   Constitutional: Negative for fever and malaise/fatigue.   Musculoskeletal:        No pain proximal or distal to the site.   Neurological: Negative for sensory change.       PMH:  has a past medical history of Anxiety; Cancer (CMS-HCC); Complete traumatic metacarpophalangeal amputation of finger (12/19/2012); Depression; History of tobacco abuse (5/18/2017); Hypothyroid; and Tobacco abuse (5/18/2017).  MEDS:   Current Outpatient Prescriptions:   •  ALPRAZolam (XANAX) 0.5 MG Tab, Take 1 Tab by mouth at bedtime as needed for Sleep for up to 30 days., Disp: 30 Tab, Rfl: 0  •  escitalopram (LEXAPRO) 20 MG tablet, Take 1 Tab by mouth every day., Disp: 90 Tab, Rfl: 3  •  levothyroxine (SYNTHROID) 150 MCG Tab, Take 1 Tab by mouth Every morning on an empty stomach., Disp: 90 Tab, Rfl: 3  •  tramadol (ULTRAM) 50 MG Tab, Take 1 Tab by mouth every four hours as needed., Disp: 30 Tab, Rfl: 0  ALLERGIES:   Allergies   Allergen Reactions   • Latex Rash   • Morphine Rash   • Sulfa Drugs Itching     Skin starts to slough of   • Sulfamethoxazole-Trimethoprim Rash     Bactrum      SURGHX:   Past Surgical History:   Procedure Laterality Date   • THYROIDECTOMY TOTAL      2008   • WIDE EXCISION MELANOMA, LEG, W/POSS.STSG      2014     SOCHX:  reports that she has quit smoking. She has a 3.25 pack-year smoking history. She has quit using smokeless tobacco. She reports that she does  "not drink alcohol or use drugs.  FH: family history includes Cancer in her father, paternal grandfather, and paternal grandmother.     Objective:     /68   Pulse 78   Temp 37 °C (98.6 °F)   Ht 1.651 m (5' 5\")   Wt 74.8 kg (165 lb)   SpO2 96%   BMI 27.46 kg/m²      Physical Exam   Constitutional: Vital signs are normal. She appears well-developed and well-nourished.   Cardiovascular:   No significant pedal edema. Capillary refill is normal.   Lymphadenopathy:   No lymphangitis proximally.   Neurological:   Sensation to light touch and pressure intact.   Skin: Skin is warm and dry. Bruising and laceration noted. No erythema.                    Assessment/Plan:     1. Encounter for removal of sutures  6 Sutures removed, Polysporin and dressing applied  Advised continued protective dressing to distal wound until healed.  2. Postoperative visit  Follow-up as directed      "

## 2018-02-21 NOTE — PATIENT INSTRUCTIONS
Suture Removal  You have had your sutures (stitches) removed today. This means your wound has healed well enough to take out your stitches. Be careful to protect the wound area over the next several weeks. An injury this area could cause the cut to split open again. It usually takes 1-2 years for a scar to get its full strength and loose its redness. For wounds that heal slowly, tapes may be applied to reinforce the skin for several days after the stitches are removed.  You may allow the sutured area to get wet. Topical antibiotics (antibiotics you put on your skin) are not usually needed at this point. Applying vitamin E oil and aloe vera ointments may help the wound heal faster and stronger. Some scars form extra pigment with exposure to sunlight during the first 6-12 months after repair. This can be prevented by using a sun block (SPF 15-30) on the affected area. Call your doctor if you have any concerns about your injury. Call right away if you have any evidence of wound infection such as increased pain, drainage, redness, or swelling.  Document Released: 01/25/2006 Document Revised: 03/11/2013 Document Reviewed: 10/09/2009  [employer]® Patient Information ©2013 [employer].

## 2018-04-12 ENCOUNTER — OFFICE VISIT (OUTPATIENT)
Dept: URGENT CARE | Facility: CLINIC | Age: 38
End: 2018-04-12
Payer: MEDICARE

## 2018-04-12 VITALS
DIASTOLIC BLOOD PRESSURE: 60 MMHG | OXYGEN SATURATION: 96 % | WEIGHT: 165 LBS | SYSTOLIC BLOOD PRESSURE: 104 MMHG | HEART RATE: 129 BPM | HEIGHT: 65 IN | BODY MASS INDEX: 27.49 KG/M2 | TEMPERATURE: 100 F

## 2018-04-12 DIAGNOSIS — J98.8 RTI (RESPIRATORY TRACT INFECTION): ICD-10-CM

## 2018-04-12 PROCEDURE — 99214 OFFICE O/P EST MOD 30 MIN: CPT | Performed by: FAMILY MEDICINE

## 2018-04-12 RX ORDER — PREDNISONE 20 MG/1
40 TABLET ORAL EVERY MORNING
Qty: 12 TAB | Refills: 0 | Status: SHIPPED | OUTPATIENT
Start: 2018-04-12 | End: 2018-04-18

## 2018-04-12 RX ORDER — AZITHROMYCIN 250 MG/1
TABLET, FILM COATED ORAL
Qty: 6 TAB | Refills: 0 | Status: SHIPPED | OUTPATIENT
Start: 2018-04-12 | End: 2018-06-12

## 2018-04-12 RX ORDER — IPRATROPIUM BROMIDE AND ALBUTEROL SULFATE 2.5; .5 MG/3ML; MG/3ML
3 SOLUTION RESPIRATORY (INHALATION) ONCE
Status: COMPLETED | OUTPATIENT
Start: 2018-04-12 | End: 2018-04-12

## 2018-04-12 RX ORDER — DEXTROMETHORPHAN HYDROBROMIDE AND PROMETHAZINE HYDROCHLORIDE 15; 6.25 MG/5ML; MG/5ML
5 SYRUP ORAL EVERY 6 HOURS PRN
Qty: 120 ML | Refills: 0 | Status: SHIPPED | OUTPATIENT
Start: 2018-04-12 | End: 2018-06-12

## 2018-04-12 RX ADMIN — IPRATROPIUM BROMIDE AND ALBUTEROL SULFATE 3 ML: 2.5; .5 SOLUTION RESPIRATORY (INHALATION) at 17:00

## 2018-04-12 ASSESSMENT — ENCOUNTER SYMPTOMS
FEVER: 0
DIZZINESS: 0
CHILLS: 0
SPUTUM PRODUCTION: 0
FOCAL WEAKNESS: 0
COUGH: 1

## 2018-04-12 NOTE — PROGRESS NOTES
"Subjective:      Vanda Cortez is a 37 y.o. female who presents with Cough (x1.5 weeks, coughing, tightness in chest )    Chief Complaint   Patient presents with   • Cough     x1.5 weeks, coughing, tightness in chest         - This is a very pleasant 37 y.o. 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   • Depression    • History of tobacco abuse 5/18/2017   • Hypothyroid    • Tobacco abuse 5/18/2017        MEDS:    Current Outpatient Prescriptions:   •  predniSONE (DELTASONE) 20 MG Tab, Take 2 Tabs by mouth every morning for 6 days., Disp: 12 Tab, Rfl: 0  •  promethazine-dextromethorphan (PROMETHAZINE-DM) 6.25-15 MG/5ML syrup, Take 5 mL by mouth every 6 hours as needed for Cough., Disp: 120 mL, Rfl: 0  •  azithromycin (ZITHROMAX) 250 MG Tab, Use as directed, Disp: 6 Tab, Rfl: 0  •  escitalopram (LEXAPRO) 20 MG tablet, Take 1 Tab by mouth every day., Disp: 90 Tab, Rfl: 3  •  levothyroxine (SYNTHROID) 150 MCG Tab, Take 1 Tab by mouth Every morning on an empty stomach., Disp: 90 Tab, Rfl: 3  •  tramadol (ULTRAM) 50 MG Tab, Take 1 Tab by mouth every four hours as needed., Disp: 30 Tab, Rfl: 0    ** I have documented what I find to be significant in regards to past medical, social, family and surgical history  in my HPI or under PMH/PSH/FH review section, otherwise it is contributory **           HPI    Review of Systems   Constitutional: Negative for chills and fever.   HENT: Positive for congestion.    Respiratory: Positive for cough. Negative for sputum production.    Neurological: Negative for dizziness and focal weakness.          Objective:     /60   Pulse (!) 129   Temp 37.8 °C (100 °F)   Ht 1.651 m (5' 5\")   Wt 74.8 kg (165 lb)   SpO2 " 96%   BMI 27.46 kg/m²    Recheck 90HR  Physical Exam   Constitutional: She appears well-developed. No distress.   HENT:   Head: Normocephalic and atraumatic.   Mouth/Throat: Oropharynx is clear and moist.   Eyes: Conjunctivae are normal.   Neck: Neck supple.   Cardiovascular: Regular rhythm.    No murmur heard.  Pulmonary/Chest: Effort normal and breath sounds normal. No respiratory distress.   Neurological: She is alert. She exhibits normal muscle tone.   Skin: Skin is warm and dry.   Psychiatric: She has a normal mood and affect. Judgment normal.   Nursing note and vitals reviewed.              Assessment/Plan:         1. RTI (respiratory tract infection)  ipratropium-albuterol (DUONEB) nebulizer solution 3 mL    predniSONE (DELTASONE) 20 MG Tab    promethazine-dextromethorphan (PROMETHAZINE-DM) 6.25-15 MG/5ML syrup    azithromycin (ZITHROMAX) 250 MG Tab             Dx & d/c instructions discussed w/ patient and/or family members. Follow up w/ Prvt Dr or here in 3-4 days if not getting better, sooner if needed,  ER if worse and UC/PCP unavailable.        Possible side effects (i.e. Rash, GI upset/constipation, sedation, elevation of BP or sugars) of any medications given discussed.

## 2018-05-15 ENCOUNTER — APPOINTMENT (OUTPATIENT)
Dept: MEDICAL GROUP | Facility: MEDICAL CENTER | Age: 38
End: 2018-05-15
Payer: MEDICARE

## 2018-05-21 ENCOUNTER — APPOINTMENT (OUTPATIENT)
Dept: MEDICAL GROUP | Facility: MEDICAL CENTER | Age: 38
End: 2018-05-21
Payer: MEDICARE

## 2018-06-06 ENCOUNTER — APPOINTMENT (OUTPATIENT)
Dept: MEDICAL GROUP | Facility: MEDICAL CENTER | Age: 38
End: 2018-06-06
Payer: MEDICARE

## 2018-06-12 ENCOUNTER — OFFICE VISIT (OUTPATIENT)
Dept: MEDICAL GROUP | Facility: MEDICAL CENTER | Age: 38
End: 2018-06-12
Payer: MEDICARE

## 2018-06-12 VITALS
OXYGEN SATURATION: 95 % | WEIGHT: 183.6 LBS | DIASTOLIC BLOOD PRESSURE: 78 MMHG | RESPIRATION RATE: 16 BRPM | BODY MASS INDEX: 30.59 KG/M2 | HEART RATE: 89 BPM | HEIGHT: 65 IN | TEMPERATURE: 98.2 F | SYSTOLIC BLOOD PRESSURE: 118 MMHG

## 2018-06-12 DIAGNOSIS — E89.0 POSTOPERATIVE HYPOTHYROIDISM: ICD-10-CM

## 2018-06-12 DIAGNOSIS — Z85.850 HX OF PAPILLARY THYROID CARCINOMA: ICD-10-CM

## 2018-06-12 DIAGNOSIS — F41.9 ANXIETY: ICD-10-CM

## 2018-06-12 DIAGNOSIS — G47.00 INSOMNIA, UNSPECIFIED TYPE: ICD-10-CM

## 2018-06-12 PROCEDURE — 99213 OFFICE O/P EST LOW 20 MIN: CPT | Performed by: FAMILY MEDICINE

## 2018-06-12 RX ORDER — ALPRAZOLAM 0.5 MG/1
0.5 TABLET ORAL NIGHTLY PRN
Qty: 30 TAB | Refills: 0 | Status: SHIPPED | OUTPATIENT
Start: 2018-06-12 | End: 2018-07-12

## 2018-06-12 ASSESSMENT — PATIENT HEALTH QUESTIONNAIRE - PHQ9: CLINICAL INTERPRETATION OF PHQ2 SCORE: 0

## 2018-06-12 NOTE — PROGRESS NOTES
"cc: Thyroid    Subjective:     Vanda Cortez is a 37 y.o. female presenting for:    1. Thyroid issues: She reports a history of hypothyroidism after her thyroid was surgically removed. She states that she had papillary carcinoma at that time. She was being managed by endocrinology at [facility] for years, but she has not followed up with endocrinology for the past several years. She is currently taking levothyroxine 150 µg daily. However, her work schedule has been quite erratic and she does not take her medications regularly. She will occasionally take an evening or about 6 hours after eating. Her last TSH was 3.13 and December 2017. She reports that her TSH normally runs between 6-8. Denies any heat/cold intolerance, abdominal pain, tremors, skin changes, palpitations. She has noted some more diarrhea and constipation    2. Sleep, anxiety: She would also like refills of her Xanax. She usually takes one or part of one before bedtime as needed for sleep. She will occasionally take it for panic attacks. She reports having anxiety since her history of stage III melanoma. She has tried Ambien, belsomra, trazodone in the past. She continues to take Lexapro 20 mg daily with no issues      Review of systems:  See above.       Current Outpatient Prescriptions:   •  ALPRAZolam (XANAX) 0.5 MG Tab, Take 1 Tab by mouth at bedtime as needed for Sleep for up to 30 days., Disp: 30 Tab, Rfl: 0  •  escitalopram (LEXAPRO) 20 MG tablet, Take 1 Tab by mouth every day., Disp: 90 Tab, Rfl: 3  •  levothyroxine (SYNTHROID) 150 MCG Tab, Take 1 Tab by mouth Every morning on an empty stomach., Disp: 90 Tab, Rfl: 3    Allergies, past medical history, past surgical history, family history, social history reviewed and updated    Objective:     Vitals: /78   Pulse 89   Temp 36.8 °C (98.2 °F)   Resp 16   Ht 1.651 m (5' 5\")   Wt 83.3 kg (183 lb 9.6 oz)   LMP 06/10/2018   SpO2 95%   BMI 30.55 kg/m²   General: Alert, pleasant, NAD  HEENT: " Normocephalic.    Psych:  Affect/mood is normal, judgement is good, memory is intact, grooming is appropriate.    Assessment/Plan:     Vanda was seen today for thyroid problem and medication refill.    Diagnoses and all orders for this visit:    Postoperative hypothyroidism  Hx of papillary thyroid carcinoma  Will check labs. Continue current dose of levothyroxine for now. We did discuss referring back to endocrinology for continued management of her thyroid given her history of thyroid cancer. She may need to have a lower TSH goal, ultrasound of the thyroid in the future.  -     TSH; Future  -     FREE THYROXINE; Future  -     TRIIDOTHYRONINE; Future    Insomnia, unspecified type  Anxiety  I have reviewed the medical records and have determined that a controlled substance treatment is medically indicated for now. She'll make an appointment with her regular PCP to discuss. Did discuss with patient about controlled substance agreements, urine drug screens, regular follow-up with her regular provider for future refills. Prescription for 30 days given. [place] and [place]  checked, appropriate.   -     ALPRAZolam (XANAX) 0.5 MG Tab; Take 1 Tab by mouth at bedtime as needed for Sleep for up to 30 days. #30

## 2018-08-24 ENCOUNTER — HOSPITAL ENCOUNTER (OUTPATIENT)
Dept: LAB | Facility: MEDICAL CENTER | Age: 38
End: 2018-08-24
Attending: FAMILY MEDICINE
Payer: MEDICARE

## 2018-08-24 DIAGNOSIS — E89.0 POSTOPERATIVE HYPOTHYROIDISM: ICD-10-CM

## 2018-08-24 DIAGNOSIS — Z85.850 HX OF PAPILLARY THYROID CARCINOMA: ICD-10-CM

## 2018-08-24 LAB
T3 SERPL-MCNC: 69.1 NG/DL (ref 60–181)
T4 FREE SERPL-MCNC: 0.63 NG/DL (ref 0.53–1.43)
TSH SERPL DL<=0.005 MIU/L-ACNC: 47.4 UIU/ML (ref 0.38–5.33)

## 2018-08-24 PROCEDURE — 84439 ASSAY OF FREE THYROXINE: CPT

## 2018-08-24 PROCEDURE — 84480 ASSAY TRIIODOTHYRONINE (T3): CPT

## 2018-08-24 PROCEDURE — 36415 COLL VENOUS BLD VENIPUNCTURE: CPT

## 2018-08-24 PROCEDURE — 84443 ASSAY THYROID STIM HORMONE: CPT

## 2018-08-27 ENCOUNTER — OFFICE VISIT (OUTPATIENT)
Dept: MEDICAL GROUP | Facility: MEDICAL CENTER | Age: 38
End: 2018-08-27
Payer: MEDICARE

## 2018-08-27 VITALS
TEMPERATURE: 98.4 F | DIASTOLIC BLOOD PRESSURE: 60 MMHG | SYSTOLIC BLOOD PRESSURE: 120 MMHG | RESPIRATION RATE: 15 BRPM | HEART RATE: 96 BPM | OXYGEN SATURATION: 95 %

## 2018-08-27 DIAGNOSIS — F33.1 MODERATE EPISODE OF RECURRENT MAJOR DEPRESSIVE DISORDER (HCC): ICD-10-CM

## 2018-08-27 DIAGNOSIS — F41.9 ANXIETY: ICD-10-CM

## 2018-08-27 DIAGNOSIS — E89.0 POSTOPERATIVE HYPOTHYROIDISM: ICD-10-CM

## 2018-08-27 PROCEDURE — 99213 OFFICE O/P EST LOW 20 MIN: CPT | Performed by: NURSE PRACTITIONER

## 2018-08-27 RX ORDER — LEVOTHYROXINE SODIUM 175 UG/1
175 TABLET ORAL
Qty: 30 TAB | Refills: 2 | Status: SHIPPED | OUTPATIENT
Start: 2018-08-27 | End: 2018-11-21 | Stop reason: SDUPTHER

## 2018-08-27 RX ORDER — ESCITALOPRAM OXALATE 20 MG/1
20 TABLET ORAL DAILY
Qty: 90 TAB | Refills: 3 | Status: SHIPPED | OUTPATIENT
Start: 2018-08-27 | End: 2018-11-21 | Stop reason: SDUPTHER

## 2018-08-27 RX ORDER — ALPRAZOLAM 0.5 MG/1
0.5 TABLET ORAL NIGHTLY PRN
Qty: 30 TAB | Refills: 2 | Status: SHIPPED | OUTPATIENT
Start: 2018-08-27 | End: 2018-11-21 | Stop reason: SDUPTHER

## 2018-08-27 RX ORDER — ALPRAZOLAM 0.5 MG/1
0.5 TABLET ORAL NIGHTLY PRN
COMMUNITY
End: 2018-08-27 | Stop reason: SDUPTHER

## 2018-08-29 DIAGNOSIS — Z11.1 SCREENING FOR TUBERCULOSIS: ICD-10-CM

## 2018-08-29 NOTE — PROGRESS NOTES
cc: Follow-up thyroid labs and anxiety      Subjective:     HPI:     Vanda Cortez is a 37 y.o. female here to discuss the evaluation and management of:    Thyroid  Patient states she has been taking her levothyroxine 150 mcg daily on empty stomach.  States she has been having a lot of grapefruit.  She wonders if this might affect the absorption.  Denies any specific change in symptoms other than just feeling little bit fatigued.  Most recent TSH was 47.40 on her August labs.  Which is a significant increase from the last test back in December.    Depression  She has been taking her Lexapro for her depression.  Denies any suicidal thoughts or ideations.  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    Anxiety  Patient continues to have a lot of anxiety surrounding her previous medical diagnoses as well as her family medical problems.  Requesting refill on her Xanax.  Patient states that she did have her liposuction done on her right leg to help reduce the lymphedema from her leg.  Patient states that she does get anxious when she is talking about cancer and tumors and lengthy treatment courses for people with cancer.  States this gives her great anxiety.        ROS:  Denies any Headache, Blurred Vision, Confusion, Chest pain,  Shortness of breath,  Abdominal pain, Changes of bowel or bladder, Lower ext edema, Fevers, Nights sweats, Weight Changes, Focal weakness or numbness.  All other systems are negative.  Anxiety, fatigue        Current Outpatient Prescriptions:   •  levothyroxine (SYNTHROID) 175 MCG Tab, Take 1 Tab by mouth Every morning on an empty stomach., Disp: 30 Tab, Rfl: 2  •  escitalopram (LEXAPRO) 20 MG tablet, Take 1 Tab by mouth every day., Disp: 90 Tab, Rfl: 3  •  ALPRAZolam (XANAX) 0.5 MG Tab, Take 1 Tab by mouth at bedtime as needed for Sleep for up to 90 days., Disp: 30 Tab, Rfl: 2    Allergies   Allergen Reactions   • Latex Rash   • Morphine Rash   •  Sulfa Drugs Itching     Skin starts to slough of   • Sulfamethoxazole-Trimethoprim Rash     Bactrum        Past Medical History:   Diagnosis Date   • Anxiety    • Cancer (HCC)     melanoma - 2014   • 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   • Depression    • History of tobacco abuse 5/18/2017   • Hypothyroid    • Tobacco abuse 5/18/2017     Past Surgical History:   Procedure Laterality Date   • THYROIDECTOMY TOTAL      2008   • WIDE EXCISION MELANOMA, LEG, W/POSS.STSG      2014     Family History   Problem Relation Age of Onset   • Cancer Father         Squamous cell carcinoma   • Cancer Paternal Grandmother         Squamous cell carcinoma   • Cancer Paternal Grandfather         Squamous cell carcinoma     Social History     Social History   • Marital status: Single     Spouse name: N/A   • Number of children: N/A   • Years of education: N/A     Occupational History   • Not on file.     Social History Main Topics   • Smoking status: Current Some Day Smoker     Packs/day: 1.00     Years: 13.00   • Smokeless tobacco: Former User      Comment: Occ. 1 a week    • Alcohol use No   • Drug use: No   • Sexual activity: Yes     Partners: Male     Other Topics Concern   • Not on file     Social History Narrative   • No narrative on file       Objective:     Vitals: /60   Pulse 96   Temp 36.9 °C (98.4 °F)   Resp 15   SpO2 95%    General: Alert, pleasant, NAD  HEENT: Normocephalic.    Heart: Regular rate and rhythm.  S1 and S2 normal.  No murmurs appreciated.  Respiratory: Normal respiratory effort.  Clear to auscultation bilaterally.  Skin: Warm, dry, no rashes.  Musculoskeletal: Gait is normal.  Moves all extremities well.  Extremities: No leg edema.    Neurological: No tremors, sensation grossly intact, gait is normal,   Psych:  Affect/mood is anxious, judgement is good, memory is intact, grooming is appropriate.    Assessment/Plan:     Vanda was seen today for labs only.    Diagnoses  and all orders for this visit:    Postoperative hypothyroidism  Not controlled at this time.  Most recent TSH was elevated 47.40.  Patient states that she has been taking on empty stomach with water.  States that she was working a lot of swing shifts and overnight shift so this may in fact her fasting status.  Also states that she has been eating a lot of grapefruit as well.  Reviewed medication interactions and grapefruit actually happens to be particular food that inhibits the absorption of the thyroid.  Patient will increase her levothyroxine 175 try to make sure she is taking her pill on an empty stomach at least waiting 30 minutes and reduce the amount of grapefruit she has.  Will follow back up with repeat labs.  -     TSH WITH REFLEX TO FT4; Future  -     levothyroxine (SYNTHROID) 175 MCG Tab; Take 1 Tab by mouth Every morning on an empty stomach.  -     TRIIDOTHYRONINE; Future    Moderate episode of recurrent major depressive disorder (HCC)  Stable mood at this time.  Continue taking Lexapro.  -     escitalopram (LEXAPRO) 20 MG tablet; Take 1 Tab by mouth every day.  -     ALPRAZolam (XANAX) 0.5 MG Tab; Take 1 Tab by mouth at bedtime as needed for Sleep for up to 90 days.    Anxiety  Her anxiety is somewhat  elevated after discussion today about her thyroid medications and how we need to adjust them.  Narx Check completed. Last prescription was June/2018  -     escitalopram (LEXAPRO) 20 MG tablet; Take 1 Tab by mouth every day.  -     ALPRAZolam (XANAX) 0.5 MG Tab; Take 1 Tab by mouth at bedtime as needed for Sleep for up to 90 days.           Health care Maintenance:     No Follow-up on file.          Carla LOZANO

## 2018-08-30 ENCOUNTER — OFFICE VISIT (OUTPATIENT)
Dept: URGENT CARE | Facility: CLINIC | Age: 38
End: 2018-08-30
Payer: MEDICARE

## 2018-08-30 VITALS
RESPIRATION RATE: 16 BRPM | DIASTOLIC BLOOD PRESSURE: 62 MMHG | BODY MASS INDEX: 30.49 KG/M2 | SYSTOLIC BLOOD PRESSURE: 120 MMHG | HEIGHT: 65 IN | TEMPERATURE: 98.3 F | WEIGHT: 183 LBS | HEART RATE: 84 BPM | OXYGEN SATURATION: 99 %

## 2018-08-30 DIAGNOSIS — H93.8X3 EAR CONGESTION, BILATERAL: ICD-10-CM

## 2018-08-30 DIAGNOSIS — J01.40 ACUTE NON-RECURRENT PANSINUSITIS: Primary | ICD-10-CM

## 2018-08-30 DIAGNOSIS — E66.9 OBESITY (BMI 30-39.9): ICD-10-CM

## 2018-08-30 PROCEDURE — 99214 OFFICE O/P EST MOD 30 MIN: CPT | Performed by: PHYSICIAN ASSISTANT

## 2018-08-30 RX ORDER — AMOXICILLIN AND CLAVULANATE POTASSIUM 875; 125 MG/1; MG/1
1 TABLET, FILM COATED ORAL 2 TIMES DAILY
Qty: 14 TAB | Refills: 0 | Status: SHIPPED | OUTPATIENT
Start: 2018-08-30 | End: 2018-09-06

## 2018-08-30 RX ORDER — METHYLPREDNISOLONE 4 MG/1
TABLET ORAL
Qty: 21 TAB | Refills: 0 | Status: SHIPPED
Start: 2018-08-30 | End: 2019-12-29

## 2018-08-30 NOTE — LETTER
August 30, 2018       Patient: Vanda Cortez   YOB: 1980   Date of Visit: 8/30/2018         To Whom It May Concern:    It is my medical opinion that Vanda Cortez may be excused from work for the date of 9/1/18.      If you have any questions or concerns, please don't hesitate to call [phone]          Sincerely,          Federico Lynn P.A.-C.  Electronically Signed

## 2018-08-31 NOTE — PROGRESS NOTES
Subjective:      Pt is a 37 y.o. female who presents with Earache (x 2 days, stiff neck, both ears, sore throat. mostly (R) side. )            HPI  PT presents to  clinic today complaining of sore throat,  pressure in ears, cough, fatigue, runny nose, post nasal drip, sinus pressure and headache. PT denies CP, SOB, NVD, abdominal pain, joint pain. PT states these symptoms began around 2 days ago. PT states the pain is a 7/10 with swallowing, aching in nature and worse at night.  Pt has not taken any RX medications for this condition. The pt's medication list, problem list, and allergies have been evaluated and reviewed during today's visit.    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  Family Status   Relation Status   • Fa (Not Specified)   • PGMo (Not Specified)   • PGFa (Not Specified)       Soc HX:  Social History     Social History   • Marital status: Single     Spouse name: N/A   • Number of children: N/A   • Years of education: N/A     Occupational History   • Not on file.     Social History Main Topics   • Smoking status: Current Some Day Smoker     Packs/day: 1.00     Years: 13.00   • Smokeless tobacco: Former User      Comment: Occ. 1 a week    • Alcohol use No   • Drug use: No   • Sexual activity: Yes     Partners: Male     Other Topics Concern   • Not on file     Social History Narrative   • No narrative on file         Medications:    Current Outpatient Prescriptions:   •  loratadine-pseudoephedrine (CLARITIN-D 24 HOUR)  " MG per tablet, Take 1 Tab by mouth every day., Disp: 30 Tab, Rfl: 0  •  amoxicillin-clavulanate (AUGMENTIN) 875-125 MG Tab, Take 1 Tab by mouth 2 times a day for 7 days., Disp: 14 Tab, Rfl: 0  •  MethylPREDNISolone (MEDROL DOSEPAK) 4 MG Tablet Therapy Pack, Use as directed, Disp: 21 Tab, Rfl: 0  •  levothyroxine (SYNTHROID) 175 MCG Tab, Take 1 Tab by mouth Every morning on an empty stomach., Disp: 30 Tab, Rfl: 2  •  escitalopram (LEXAPRO) 20 MG tablet, Take 1 Tab by mouth every day., Disp: 90 Tab, Rfl: 3  •  ALPRAZolam (XANAX) 0.5 MG Tab, Take 1 Tab by mouth at bedtime as needed for Sleep for up to 90 days., Disp: 30 Tab, Rfl: 2      Allergies:  Latex; Morphine; Sulfa drugs; and Sulfamethoxazole-trimethoprim    ROS  Constitutional: Positive for body aches and malaise/fatigue.   HENT: Positive for congestion and sore throat. Negative for ear pain.    Eyes: Negative for blurred vision, double vision and photophobia.   Respiratory:  Negative for cough, shortness of breath and wheezing.    Cardiovascular: Negative for chest pain and palpitations.   Gastrointestinal: Negative for nausea, vomiting, abdominal pain, diarrhea and constipation.   Genitourinary: Negative for dysuria and flank pain.   Musculoskeletal: Negative for falls and myalgias.   Skin: Negative for itching and rash.   Neurological: Positive for headaches. Negative for dizziness and tingling.   Endo/Heme/Allergies: Does not bruise/bleed easily.   Psychiatric/Behavioral: Negative for depression. The patient is not nervous/anxious.             Objective:     /62   Pulse 84   Temp 36.8 °C (98.3 °F)   Resp 16   Ht 1.651 m (5' 5\")   Wt 83 kg (183 lb)   SpO2 99%   BMI 30.45 kg/m²      Physical Exam        Physical Exam   Constitutional: Pt is oriented to person, place, and time. Pt appears well-developed and well-nourished. No distress.   HENT:   Head: Normocephalic and atraumatic.  B/L ear congestion/effusion   Right Ear: Hearing, tympanic " membrane, external ear and ear canal normal.   Left Ear: Hearing, tympanic membrane, external ear and ear canal normal.   Nose: Mucosal edema, rhinorrhea and sinus tenderness present. No nose lacerations, nasal deformity, septal deviation or nasal septal hematoma. No epistaxis.  No foreign bodies. Right sinus exhibits maxillary sinus tenderness and frontal sinus tenderness. Left sinus exhibits maxillary sinus tenderness and frontal sinus tenderness.   Mouth/Throat: Oropharynx is clear and moist. No oropharyngeal exudate.   Eyes: Conjunctivae normal and EOM are normal. Pupils are equal, round, and reactive to light. Right eye exhibits no discharge. Left eye exhibits no discharge.   Neck: Normal range of motion. Neck supple. No tracheal deviation present. No thyromegaly present.   Cardiovascular: Normal rate, regular rhythm, normal heart sounds and intact distal pulses.  Exam reveals no gallop and no friction rub.    No murmur heard.  Pulmonary/Chest: Effort normal and breath sounds normal. No respiratory distress. Pt has no wheezes. Pt has no rales. Pt exhibits no tenderness.   Abdominal: Soft. Bowel sounds are normal. Pt exhibits no distension and no mass. There is no tenderness. There is no rebound and no guarding.   Musculoskeletal: Normal range of motion. Pt exhibits no edema and no tenderness.   Lymphadenopathy:     Pt has no cervical adenopathy.   Neurological: Pt is alert and oriented to person, place, and time. Pt has normal reflexes. Pt displays normal reflexes. No cranial nerve deficit. Pt exhibits normal muscle tone. Coordination normal.   Skin: Skin is warm and dry. No rash noted. No erythema.   Psychiatric: Pt has a normal mood and affect. Pt behavior is normal. Judgment and thought content normal.            Assessment/Plan:     1. Acute non-recurrent pansinusitis    - loratadine-pseudoephedrine (CLARITIN-D 24 HOUR)  MG per tablet; Take 1 Tab by mouth every day.  Dispense: 30 Tab; Refill: 0  -  amoxicillin-clavulanate (AUGMENTIN) 875-125 MG Tab; Take 1 Tab by mouth 2 times a day for 7 days.  Dispense: 14 Tab; Refill: 0  - MethylPREDNISolone (MEDROL DOSEPAK) 4 MG Tablet Therapy Pack; Use as directed  Dispense: 21 Tab; Refill: 0    2. Ear congestion, bilateral    - loratadine-pseudoephedrine (CLARITIN-D 24 HOUR)  MG per tablet; Take 1 Tab by mouth every day.  Dispense: 30 Tab; Refill: 0  - MethylPREDNISolone (MEDROL DOSEPAK) 4 MG Tablet Therapy Pack; Use as directed  Dispense: 21 Tab; Refill: 0    3. Obesity (BMI 30-39.9)    - Patient identified as having weight management issue.  Appropriate orders and counseling given.    Rest, fluids encouraged.  OTC decongestant for congestion/cough  Note given for work.  AVS with medical info given.  Pt was in full understanding and agreement with the plan.  Follow-up as needed if symptoms worsen or fail to improve.

## 2018-11-21 DIAGNOSIS — E89.0 POSTOPERATIVE HYPOTHYROIDISM: ICD-10-CM

## 2018-11-21 DIAGNOSIS — F41.9 ANXIETY: ICD-10-CM

## 2018-11-21 DIAGNOSIS — F33.1 MODERATE EPISODE OF RECURRENT MAJOR DEPRESSIVE DISORDER (HCC): ICD-10-CM

## 2018-11-21 RX ORDER — ALPRAZOLAM 0.5 MG/1
0.5 TABLET ORAL NIGHTLY PRN
Qty: 30 TAB | Refills: 2 | Status: SHIPPED
Start: 2018-11-21 | End: 2019-02-19

## 2018-11-21 RX ORDER — ESCITALOPRAM OXALATE 20 MG/1
20 TABLET ORAL DAILY
Qty: 90 TAB | Refills: 3 | Status: SHIPPED
Start: 2018-11-21 | End: 2020-01-15 | Stop reason: SDUPTHER

## 2018-11-21 RX ORDER — LEVOTHYROXINE SODIUM 175 UG/1
175 TABLET ORAL
Qty: 30 TAB | Refills: 2 | Status: SHIPPED | OUTPATIENT
Start: 2018-11-21 | End: 2019-03-06 | Stop reason: SDUPTHER

## 2019-03-06 DIAGNOSIS — E89.0 POSTOPERATIVE HYPOTHYROIDISM: ICD-10-CM

## 2019-03-06 RX ORDER — LEVOTHYROXINE SODIUM 175 UG/1
175 TABLET ORAL
Qty: 30 TAB | Refills: 2 | Status: SHIPPED | OUTPATIENT
Start: 2019-03-06 | End: 2019-03-06 | Stop reason: SDUPTHER

## 2019-03-06 RX ORDER — LEVOTHYROXINE SODIUM 175 UG/1
175 TABLET ORAL
Qty: 90 TAB | Refills: 2 | Status: SHIPPED
Start: 2019-03-06 | End: 2020-01-15 | Stop reason: SDUPTHER

## 2019-05-12 ENCOUNTER — OFFICE VISIT (OUTPATIENT)
Dept: URGENT CARE | Facility: CLINIC | Age: 39
End: 2019-05-12
Payer: MEDICARE

## 2019-05-12 VITALS
RESPIRATION RATE: 16 BRPM | BODY MASS INDEX: 30.49 KG/M2 | TEMPERATURE: 99 F | HEIGHT: 65 IN | HEART RATE: 78 BPM | WEIGHT: 183 LBS | SYSTOLIC BLOOD PRESSURE: 120 MMHG | OXYGEN SATURATION: 100 % | DIASTOLIC BLOOD PRESSURE: 74 MMHG

## 2019-05-12 DIAGNOSIS — L98.9 SKIN LESION OF RIGHT ARM: ICD-10-CM

## 2019-05-12 PROCEDURE — 99214 OFFICE O/P EST MOD 30 MIN: CPT | Performed by: PHYSICIAN ASSISTANT

## 2019-05-12 ASSESSMENT — ENCOUNTER SYMPTOMS
DIARRHEA: 0
RESPIRATORY NEGATIVE: 1
NEUROLOGICAL NEGATIVE: 1
FEVER: 0
FATIGUE: 0
CARDIOVASCULAR NEGATIVE: 1
CHILLS: 0
VOMITING: 0
COUGH: 0
GASTROINTESTINAL NEGATIVE: 1
ANOREXIA: 0
SHORTNESS OF BREATH: 0
RHINORRHEA: 0

## 2019-05-23 NOTE — PROGRESS NOTES
Subjective:      Vanda Cortez is a 38 y.o. female who presents with Nodule (x4 days, bump on right elbow)            Flesh-colored asymptomatic lesion on the left elbow.  Denies any other symptoms.  History of melanoma approximately 4 years ago.      Rash   This is a new problem. The current episode started in the past 7 days. The problem is unchanged. The affected locations include the right elbow. The rash is characterized by swelling and itchiness. She was exposed to nothing. Pertinent negatives include no anorexia, congestion, cough, diarrhea, facial edema, fatigue, fever, rhinorrhea, shortness of breath or vomiting. Past treatments include nothing. The treatment provided no relief.       PMH:  has a past medical history of Anxiety; Cancer (HCC); Complete traumatic metacarpophalangeal amputation of finger (12/19/2012); Depression; History of tobacco abuse (5/18/2017); Hypothyroid; and Tobacco abuse (5/18/2017).  MEDS:   Current Outpatient Prescriptions:   •  levothyroxine (SYNTHROID) 175 MCG Tab, Take 1 Tab by mouth Every morning on an empty stomach., Disp: 90 Tab, Rfl: 2  •  escitalopram (LEXAPRO) 20 MG tablet, Take 1 Tab by mouth every day., Disp: 90 Tab, Rfl: 3  •  loratadine-pseudoephedrine (CLARITIN-D 24 HOUR)  MG per tablet, Take 1 Tab by mouth every day. (Patient not taking: Reported on 5/12/2019), Disp: 30 Tab, Rfl: 0  •  MethylPREDNISolone (MEDROL DOSEPAK) 4 MG Tablet Therapy Pack, Use as directed (Patient not taking: Reported on 5/12/2019), Disp: 21 Tab, Rfl: 0  ALLERGIES:   Allergies   Allergen Reactions   • Latex Rash   • Morphine Rash   • Sulfa Drugs Itching     Skin starts to slough of   • Sulfamethoxazole-Trimethoprim Rash     Bactrum      SURGHX:   Past Surgical History:   Procedure Laterality Date   • THYROIDECTOMY TOTAL      2008   • WIDE EXCISION MELANOMA, LEG, W/POSS.STSG      2014     SOCHX:  reports that she has been smoking.  She has a 13.00 pack-year smoking history. She has quit  "using smokeless tobacco. She reports that she does not drink alcohol or use drugs.  FH: family history includes Cancer in her father, paternal grandfather, and paternal grandmother.    Review of Systems   Constitutional: Negative for chills, fatigue and fever.   HENT: Negative.  Negative for congestion and rhinorrhea.    Respiratory: Negative.  Negative for cough and shortness of breath.    Cardiovascular: Negative.    Gastrointestinal: Negative.  Negative for anorexia, diarrhea and vomiting.   Skin: Positive for rash. Negative for itching.        Skin lesion right elbow   Neurological: Negative.        Medications, Allergies, and current problem list reviewed today in Epic     Objective:     /74   Pulse 78   Temp 37.2 °C (99 °F)   Resp 16   Ht 1.651 m (5' 5\")   Wt 83 kg (183 lb)   SpO2 100%   BMI 30.45 kg/m²      Physical Exam   Constitutional: She is oriented to person, place, and time. She appears well-developed and well-nourished. No distress.   HENT:   Head: Normocephalic and atraumatic.   Eyes: Conjunctivae and EOM are normal.   Neck: Normal range of motion. Neck supple.   Cardiovascular: Normal rate, regular rhythm and normal heart sounds.    Pulmonary/Chest: Effort normal and breath sounds normal. No respiratory distress. She has no wheezes.   Neurological: She is alert and oriented to person, place, and time.   Skin: Skin is warm and dry. She is not diaphoretic.        Flesh-colored raised macular lesion.  No open lesions or discharge.  Nontender.  Range of motion surrounding joints within normal limits.   Psychiatric: She has a normal mood and affect. Her behavior is normal. Judgment and thought content normal.   Nursing note and vitals reviewed.              Assessment/Plan:     1. Skin lesion of right arm       No signs of infection or allergy.  Unclear etiology but beyond the scope of urgent care.  Given history of melanoma I advised an immediate follow-up with her dermatologist.  She " declines referral.  She states she will call him in the morning.    Return to clinic or go to ED if symptoms worsen or persist. Indications for ED discussed at length. Patient voices understanding. Follow-up with your primary care provider in 3-5 days. Red flags discussed. All side effects of medication discussed including allergic response, GI upset, tendon injury, etc.    Please note that this dictation was created using voice recognition software. I have made every reasonable attempt to correct obvious errors, but I expect that there are errors of grammar and possibly content that I did not discover before finalizing the note.     Declines Yes

## 2019-12-29 ENCOUNTER — OFFICE VISIT (OUTPATIENT)
Dept: URGENT CARE | Facility: CLINIC | Age: 39
End: 2019-12-29
Payer: MEDICARE

## 2019-12-29 VITALS
HEIGHT: 66 IN | HEART RATE: 93 BPM | DIASTOLIC BLOOD PRESSURE: 72 MMHG | WEIGHT: 196 LBS | BODY MASS INDEX: 31.5 KG/M2 | OXYGEN SATURATION: 97 % | RESPIRATION RATE: 16 BRPM | TEMPERATURE: 99.1 F | SYSTOLIC BLOOD PRESSURE: 112 MMHG

## 2019-12-29 DIAGNOSIS — J02.9 SORETHROAT: ICD-10-CM

## 2019-12-29 DIAGNOSIS — J03.90 EXUDATIVE TONSILLITIS: ICD-10-CM

## 2019-12-29 LAB
HETEROPH AB SER QL LA: NEGATIVE
INT CON NEG: NEGATIVE
INT CON NEG: NEGATIVE
INT CON POS: POSITIVE
INT CON POS: POSITIVE
S PYO AG THROAT QL: NEGATIVE

## 2019-12-29 PROCEDURE — 87880 STREP A ASSAY W/OPTIC: CPT | Performed by: FAMILY MEDICINE

## 2019-12-29 PROCEDURE — 99214 OFFICE O/P EST MOD 30 MIN: CPT | Performed by: FAMILY MEDICINE

## 2019-12-29 PROCEDURE — 86308 HETEROPHILE ANTIBODY SCREEN: CPT | Performed by: FAMILY MEDICINE

## 2019-12-29 RX ORDER — DEXAMETHASONE SODIUM PHOSPHATE 10 MG/ML
10 INJECTION INTRAMUSCULAR; INTRAVENOUS ONCE
Status: COMPLETED | OUTPATIENT
Start: 2019-12-29 | End: 2019-12-29

## 2019-12-29 RX ORDER — AZITHROMYCIN 250 MG/1
TABLET, FILM COATED ORAL
Qty: 6 TAB | Refills: 0 | Status: SHIPPED | OUTPATIENT
Start: 2019-12-29 | End: 2020-01-15

## 2019-12-29 RX ADMIN — DEXAMETHASONE SODIUM PHOSPHATE 10 MG: 10 INJECTION INTRAMUSCULAR; INTRAVENOUS at 14:57

## 2019-12-29 NOTE — PROGRESS NOTES
"Subjective:      Vanda Cortez is a 39 y.o. female who presents with Otalgia (x 2 days) and Pharyngitis            This is a new problem.  39-year-old presenting for 2-day history of sore throat and right ear pain.  Denies any cough or congestion, recent exposure to strep or mono.      Review of Systems   All other systems reviewed and are negative.         Objective:     /72   Pulse 93   Temp 37.3 °C (99.1 °F) (Temporal)   Resp 16   Ht 1.676 m (5' 6\")   Wt 88.9 kg (196 lb)   SpO2 97%   BMI 31.64 kg/m²      Physical Exam  Constitutional:       General: She is not in acute distress.     Appearance: Normal appearance. She is not ill-appearing, toxic-appearing or diaphoretic.   HENT:      Head: Normocephalic and atraumatic.      Right Ear: Tympanic membrane, ear canal and external ear normal.      Left Ear: Tympanic membrane, ear canal and external ear normal.      Nose: Nose normal. No rhinorrhea.      Mouth/Throat:      Mouth: Mucous membranes are moist. No oral lesions.      Pharynx: Uvula midline. Oropharyngeal exudate and posterior oropharyngeal erythema present. No pharyngeal swelling or uvula swelling.      Tonsils: Tonsillar exudate present. No tonsillar abscesses. Swellin+ on the right. 1+ on the left.   Eyes:      General: No scleral icterus.     Conjunctiva/sclera: Conjunctivae normal.   Neck:      Musculoskeletal: Neck supple. Muscular tenderness present.   Cardiovascular:      Rate and Rhythm: Normal rate and regular rhythm.      Heart sounds: No murmur. No friction rub. No gallop.    Pulmonary:      Effort: Pulmonary effort is normal. No respiratory distress.      Breath sounds: No stridor. No wheezing, rhonchi or rales.   Lymphadenopathy:      Cervical: Cervical adenopathy (Right anterior cervical adenopathy) present.   Skin:     General: Skin is warm.      Coloration: Skin is not jaundiced or pale.      Findings: No erythema or rash.   Neurological:      Mental Status: She is alert and " oriented to person, place, and time.   Psychiatric:         Mood and Affect: Mood normal.             Results for orders placed or performed in visit on 12/29/19   POCT Rapid Strep A   Result Value Ref Range    Rapid Strep Screen negative     Internal Control Positive Positive     Internal Control Negative Negative          Mono is negative       Assessment/Plan:     ASSESSMENT:PLAN:  1. Exudative tonsillitis  - dexamethasone (DECADRON) injection (check route below) 10 mg  - azithromycin (ZITHROMAX) 250 MG Tab; 500mg on day One, then 250mg daily until finished  Dispense: 6 Tab; Refill: 0    2. Sorethroat  - POCT Rapid Strep A  - POCT Mononucleosis (mono)      Continue symptomatic care  Plan per orders and instructions  Warning signs reviewed

## 2020-01-15 ENCOUNTER — OFFICE VISIT (OUTPATIENT)
Dept: MEDICAL GROUP | Facility: PHYSICIAN GROUP | Age: 40
End: 2020-01-15
Payer: MEDICARE

## 2020-01-15 VITALS
OXYGEN SATURATION: 99 % | WEIGHT: 198 LBS | DIASTOLIC BLOOD PRESSURE: 70 MMHG | BODY MASS INDEX: 32.99 KG/M2 | SYSTOLIC BLOOD PRESSURE: 118 MMHG | HEIGHT: 65 IN | HEART RATE: 96 BPM | TEMPERATURE: 98.7 F | RESPIRATION RATE: 18 BRPM

## 2020-01-15 DIAGNOSIS — C43.71 MALIGNANT MELANOMA OF RIGHT LOWER EXTREMITY INCLUDING HIP (HCC): ICD-10-CM

## 2020-01-15 DIAGNOSIS — F33.1 MODERATE EPISODE OF RECURRENT MAJOR DEPRESSIVE DISORDER (HCC): ICD-10-CM

## 2020-01-15 DIAGNOSIS — F41.9 ANXIETY: ICD-10-CM

## 2020-01-15 DIAGNOSIS — E89.0 POSTOPERATIVE HYPOTHYROIDISM: ICD-10-CM

## 2020-01-15 PROCEDURE — 99215 OFFICE O/P EST HI 40 MIN: CPT | Performed by: NURSE PRACTITIONER

## 2020-01-15 RX ORDER — ESCITALOPRAM OXALATE 20 MG/1
20 TABLET ORAL DAILY
Qty: 90 TAB | Refills: 1 | Status: SHIPPED | OUTPATIENT
Start: 2020-01-15 | End: 2021-11-03

## 2020-01-15 RX ORDER — LEVOTHYROXINE SODIUM 175 UG/1
175 TABLET ORAL
Qty: 90 TAB | Refills: 1 | Status: SHIPPED | OUTPATIENT
Start: 2020-01-15 | End: 2021-11-03 | Stop reason: SDUPTHER

## 2020-01-15 ASSESSMENT — PATIENT HEALTH QUESTIONNAIRE - PHQ9
1. LITTLE INTEREST OR PLEASURE IN DOING THINGS: SEVERAL DAYS
4. FEELING TIRED OR HAVING LITTLE ENERGY: NEARLY EVERY DAY
6. FEELING BAD ABOUT YOURSELF - OR THAT YOU ARE A FAILURE OR HAVE LET YOURSELF OR YOUR FAMILY DOWN: SEVERAL DAYS
3. TROUBLE FALLING OR STAYING ASLEEP OR SLEEPING TOO MUCH: SEVERAL DAYS
5. POOR APPETITE OR OVEREATING: NOT AT ALL
SUM OF ALL RESPONSES TO PHQ QUESTIONS 1-9: 7
SUM OF ALL RESPONSES TO PHQ9 QUESTIONS 1 AND 2: 2
2. FEELING DOWN, DEPRESSED, IRRITABLE, OR HOPELESS: SEVERAL DAYS
8. MOVING OR SPEAKING SO SLOWLY THAT OTHER PEOPLE COULD HAVE NOTICED. OR THE OPPOSITE, BEING SO FIGETY OR RESTLESS THAT YOU HAVE BEEN MOVING AROUND A LOT MORE THAN USUAL: NOT AT ALL
9. THOUGHTS THAT YOU WOULD BE BETTER OFF DEAD, OR OF HURTING YOURSELF: NOT AT ALL
7. TROUBLE CONCENTRATING ON THINGS, SUCH AS READING THE NEWSPAPER OR WATCHING TELEVISION: NOT AT ALL

## 2020-01-15 ASSESSMENT — ENCOUNTER SYMPTOMS
NERVOUS/ANXIOUS: 1
FEVER: 0
ABDOMINAL PAIN: 0
DEPRESSION: 0
SHORTNESS OF BREATH: 0
PALPITATIONS: 0
BLOOD IN STOOL: 0
CHILLS: 0

## 2020-01-15 NOTE — ASSESSMENT & PLAN NOTE
Chronic in nature. She is currently taking Levothyroxine 150mcg daily. Reports last thyroid levels were drawn 2016. She denies feeling symptomatic.

## 2020-01-15 NOTE — ASSESSMENT & PLAN NOTE
Chronic in nature. Onset began 2006. Her symptoms are well-controlled with Lexapro 20 mg. She denies suicidal or homicidal ideation. She used to attend counseling and not interested in referral at this time.

## 2020-01-15 NOTE — PROGRESS NOTES
Vanda Cortez is a 39 y.o. female here to establish care. Her previous PCP was located at [facility]. She presents with the following concerns:    HPI:      Melanoma (HCC)  She was dx in 2014 with metastatic melanoma. Primary site R anterior thigh. She has had lymph node removal. She has been mentored by [facility] Dr. Mak and Dr. Mccord? oncologist at [facility].    Anxiety  This is a chronic issue. Onset began 2006. Her symptoms are managed with Lexapro 20mg daily. She also manages symptoms with getting outside, walks, breathing exercises, and yoga.     Depression  Chronic in nature. Onset began 2006. Her symptoms are well-controlled with Lexapro 20 mg. She denies suicidal or homicidal ideation. She used to attend counseling and not interested in referral at this time.    Postoperative hypothyroidism  Chronic in nature. She is currently taking Levothyroxine 150mcg daily. Reports last thyroid levels were drawn 2016. She denies feeling symptomatic.      Current medicines (including changes today)  Current Outpatient Medications   Medication Sig Dispense Refill   • escitalopram (LEXAPRO) 20 MG tablet Take 1 Tab by mouth every day. 90 Tab 1   • levothyroxine (SYNTHROID) 175 MCG Tab Take 1 Tab by mouth Every morning on an empty stomach. 90 Tab 1     No current facility-administered medications for this visit.      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  She  has a past surgical history that includes thyroidectomy total; wide excision melanoma, leg, w/poss.stsg; and amputation (Right).  Social History     Tobacco Use   • Smoking status: Current Some Day Smoker     Packs/day: 0.50     Years: 13.00     Pack years: 6.50   • Smokeless tobacco: Former User   • Tobacco comment: Occ. 1 a week    Substance Use Topics   • Alcohol  use: No     Alcohol/week: 0.0 oz   • Drug use: No     Social History     Patient does not qualify to have social determinant information on file (likely too young).   Social History Narrative   • Not on file     Family History   Problem Relation Age of Onset   • Cancer Father         Prostate cancer   • Heart Disease Father    • Diabetes Father    • Cancer Paternal Grandmother         Melanoma   • Cancer Paternal Grandfather         Melanoma   • No Known Problems Mother    • No Known Problems Sister    • Cancer Maternal Grandfather         Colon     Family Status   Relation Name Status   • Fa  Alive   • PGMo  (Not Specified)   • PGFa  (Not Specified)   • Mo  Alive   • Sis  Alive   • MGFa  (Not Specified)       Review of Systems   Constitutional: Negative for chills, fever and malaise/fatigue.   HENT: Negative for hearing loss.    Respiratory: Negative for shortness of breath.    Cardiovascular: Negative for chest pain and palpitations.   Gastrointestinal: Negative for abdominal pain and blood in stool.   Psychiatric/Behavioral: Negative for depression and suicidal ideas. The patient is nervous/anxious.      All other systems reviewed and are negative    Depression Screening    Little interest or pleasure in doing things?   Several days  Feeling down, depressed , or hopeless?  Several days  Trouble falling or staying asleep, or sleeping too much?   Several days  Feeling tired or having little energy?   Nearly every day  Poor appetite or overeating?   Not at all  Feeling bad about yourself - or that you are a failure or have let yourself or your family down?  Several days  Trouble concentrating on things, such as reading the newspaper or watching television?  Not at all  Moving or speaking so slowly that other people could have noticed.  Or the opposite - being so fidgety or restless that you have been moving around a lot more than usual?   Not at all  Thoughts that you would be better off dead, or of hurting  "yourself?   Not at all  Patient Health Questionnaire Score:  7      If depressive symptoms identified deferred to follow up visit unless specifically addressed in assesment and plan.    Interpretation of PHQ-9 Total Score   Score Severity   1-4 No Depression   5-9 Mild Depression   10-14 Moderate Depression   15-19 Moderately Severe Depression   20-27 Severe Depression         Objective:     /70   Pulse 96   Temp 37.1 °C (98.7 °F) (Temporal)   Resp 18   Ht 1.651 m (5' 5\")   Wt 89.8 kg (198 lb)   SpO2 99%  Body mass index is 32.95 kg/m².    Physical Exam:  Constitutional: Oriented to person, place, and time and well-developed, well-nourished, and in no distress.   HENT:   Head: Normocephalic and atraumatic.   Right Ear: Tympanic membrane and external ear normal.   Left Ear: Tympanic membrane and external ear normal.   Mouth/Throat: Oropharynx is clear and moist and mucous membranes are normal. No oropharyngeal exudate or posterior oropharyngeal erythema.   Eyes: Conjunctivae and EOM are normal. Pupils are equal, round, and reactive to light.   Neck: Normal range of motion. Neck supple. Thyroid gland surgically absent.    Cardiovascular: Normal rate, regular rhythm, normal heart sounds. Exam reveals no friction rub. No murmur heard.  Pulmonary/Chest: Effort normal and breath sounds normal. No respiratory distress or use of accessory muscles. No wheezes, rhonchi, or rales.   Abdominal: Soft. Bowel sounds are normal. Exhibits no distension and no mass. There is no tenderness. No hepatosplenomegaly.    Musculoskeletal: Full range of motion. No deformity or swelling of joints. DTRs intact.   Neurological: Alert and oriented to person, place, and time. Gait normal.   Skin: Skin is warm and dry. No cyanosis. No edema.  Psychiatric: Mood, memory, affect and judgment normal.     Assessment and Plan:   The following treatment plan was discussed    1. Postoperative hypothyroidism  Labs ordered to reevaluate thyroid " levels. Refill medication.  - TSH; Future  - FREE THYROXINE; Future  - Comp Metabolic Panel; Future  - levothyroxine (SYNTHROID) 175 MCG Tab; Take 1 Tab by mouth Every morning on an empty stomach.  Dispense: 90 Tab; Refill: 1    2. Moderate episode of recurrent major depressive disorder (HCC)  Stable, controlled. Refill medication.  - escitalopram (LEXAPRO) 20 MG tablet; Take 1 Tab by mouth every day.  Dispense: 90 Tab; Refill: 1    3. Anxiety  Stable, controlled. Refill medication. I did recommend mind-body interventions including yoga, meditation, and deep breathing exercises, along with regular physical activity to help control symptoms.  - escitalopram (LEXAPRO) 20 MG tablet; Take 1 Tab by mouth every day.  Dispense: 90 Tab; Refill: 1    4. Malignant melanoma of right lower extremity including hip (HCC)  Stable. She is routinely followed by dermatology and oncology. Records requested.    Total 40 minutes face-to-face time spent with patient, with greater than 50% of the total time discussing patient's issues and symptoms as listed above in assessment and plan, as well as managing coordination of care for future evaluation and treatment.    Followup: Return in about 6 months (around 7/15/2020), or if symptoms worsen or fail to improve, for For follow-up on, Depression/Anxiety.

## 2020-01-15 NOTE — LETTER
[facility]  MARTA De Paz  [address]  [place] [address]  Fax: [phone]   Authorization for Release/Disclosure of   Protected Health Information   Name: VANDA SHEPPARD : 1980 SSN: [id number]   Address: [address] [facility] [address] Phone:    [phone] (home)    I authorize the entity listed below to release/disclose the PHI below to:   [facility] and [facility]   Provider or Entity Name:     Address   City, State, Zip   Phone:      Fax:     Reason for request: continuity of care   Information to be released:    [  ] LAST COLONOSCOPY,  including any PATH REPORT and follow-up  [  ] LAST FIT/COLOGUARD RESULT [  ] LAST DEXA  [  ] LAST MAMMOGRAM  [  ] LAST PAP  [  ] LAST LABS [  ] RETINA EXAM REPORT  [  ] IMMUNIZATION RECORDS  [  ] Release all info      [  ] Check here and initial the line next to each item to release ALL health information INCLUDING  _____ Care and treatment for drug and / or alcohol abuse  _____ HIV testing, infection status, or AIDS  _____ Genetic Testing    DATES OF SERVICE OR TIME PERIOD TO BE DISCLOSED: _____________  I understand and acknowledge that:  * This Authorization may be revoked at any time by you in writing, except if your health information has already been used or disclosed.  * Your health information that will be used or disclosed as a result of you signing this authorization could be re-disclosed by the recipient. If this occurs, your re-disclosed health information may no longer be protected by State or Federal laws.  * You may refuse to sign this Authorization. Your refusal will not affect your ability to obtain treatment.  * This Authorization becomes effective upon signing and will  on (date) __________.      If no date is indicated, this Authorization will  one (1) year from the signature date.    Name: Vanda Sheppard    Signature:   Date:     1/15/2020       PLEASE FAX  REQUESTED RECORDS BACK TO: [phone]

## 2020-01-15 NOTE — ASSESSMENT & PLAN NOTE
This is a chronic issue. Onset began 2006. Her symptoms are managed with Lexapro 20mg daily. She also manages symptoms with getting outside, walks, breathing exercises, and yoga.

## 2020-01-15 NOTE — ASSESSMENT & PLAN NOTE
She was dx in 2014 with metastatic melanoma. Primary site R anterior thigh. She has had lymph node removal. She has been mentored by [facility] Dr. Mak and Dr. Mccord? oncologist at [facility].

## 2020-01-21 ENCOUNTER — OFFICE VISIT (OUTPATIENT)
Dept: URGENT CARE | Facility: CLINIC | Age: 40
End: 2020-01-21
Payer: MEDICARE

## 2020-01-21 VITALS
TEMPERATURE: 99.2 F | DIASTOLIC BLOOD PRESSURE: 68 MMHG | WEIGHT: 197 LBS | HEART RATE: 92 BPM | BODY MASS INDEX: 32.82 KG/M2 | RESPIRATION RATE: 16 BRPM | SYSTOLIC BLOOD PRESSURE: 124 MMHG | OXYGEN SATURATION: 96 % | HEIGHT: 65 IN

## 2020-01-21 DIAGNOSIS — J01.90 ACUTE NON-RECURRENT SINUSITIS, UNSPECIFIED LOCATION: ICD-10-CM

## 2020-01-21 DIAGNOSIS — J02.9 PHARYNGITIS, UNSPECIFIED ETIOLOGY: ICD-10-CM

## 2020-01-21 PROCEDURE — 99214 OFFICE O/P EST MOD 30 MIN: CPT | Performed by: PHYSICIAN ASSISTANT

## 2020-01-21 RX ORDER — DOXYCYCLINE HYCLATE 100 MG
100 TABLET ORAL 2 TIMES DAILY
Qty: 14 TAB | Refills: 0 | Status: SHIPPED | OUTPATIENT
Start: 2020-01-21 | End: 2020-01-21

## 2020-01-21 RX ORDER — FLUTICASONE PROPIONATE 50 MCG
1 SPRAY, SUSPENSION (ML) NASAL DAILY
Qty: 16 G | Refills: 0 | Status: SHIPPED | OUTPATIENT
Start: 2020-01-21 | End: 2022-01-10

## 2020-01-21 RX ORDER — DOXYCYCLINE HYCLATE 100 MG
100 TABLET ORAL 2 TIMES DAILY
Qty: 14 TAB | Refills: 0 | Status: SHIPPED | OUTPATIENT
Start: 2020-01-21 | End: 2020-01-28

## 2020-01-21 RX ORDER — DEXAMETHASONE SODIUM PHOSPHATE 10 MG/ML
10 INJECTION INTRAMUSCULAR; INTRAVENOUS ONCE
Status: COMPLETED | OUTPATIENT
Start: 2020-01-21 | End: 2020-01-21

## 2020-01-21 RX ADMIN — DEXAMETHASONE SODIUM PHOSPHATE 10 MG: 10 INJECTION INTRAMUSCULAR; INTRAVENOUS at 13:15

## 2020-01-21 ASSESSMENT — ENCOUNTER SYMPTOMS
SINUS PRESSURE: 1
TROUBLE SWALLOWING: 0
CHILLS: 0
HOARSE VOICE: 1
HEADACHES: 1
SORE THROAT: 1
SHORTNESS OF BREATH: 0
COUGH: 0

## 2020-01-21 NOTE — PROGRESS NOTES
Subjective:   Vanda Cortez is a 39 y.o. female who presents for No chief complaint on file.        Patient states that she was seen in clinic a week ago for sore throat.  She states that symptoms at that time are predominantly right-sided.  Right-sided discomfort has resolved however now she is experiencing left-sided throat pain and pain with eating and drinking.  Additionally she states that she has been very congested for the past several weeks and this is been worsening.  She feels pressure in her head.  She is frequently blowing her nose, but is having a difficult time clearing everything out.  She reports occasional bloody noses and thick discharge.    Sinusitis   This is a new problem. The current episode started 1 to 4 weeks ago. The problem has been gradually worsening since onset. There has been no fever. The pain is moderate. Associated symptoms include congestion, headaches, a hoarse voice, sinus pressure and a sore throat. Pertinent negatives include no chills, coughing, ear pain or shortness of breath. Treatments tried: decadron, z david. The treatment provided mild relief.   Pharyngitis    This is a recurrent problem. The current episode started 1 to 4 weeks ago. The problem has been waxing and waning. The pain is worse on the left side. There has been no fever. The pain is severe. Associated symptoms include congestion, headaches and a hoarse voice. Pertinent negatives include no coughing, drooling, ear discharge, ear pain, plugged ear sensation, shortness of breath or trouble swallowing. She has had no exposure to strep or mono. Treatments tried: decadron, z-david. The treatment provided moderate relief.     Review of Systems   Constitutional: Negative for chills.   HENT: Positive for congestion, hoarse voice, sinus pressure and sore throat. Negative for drooling, ear discharge, ear pain and trouble swallowing.    Respiratory: Negative for cough and shortness of breath.    Neurological: Positive for  "headaches.       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  MEDS:   Current Outpatient Medications:   •  doxycycline (VIBRAMYCIN) 100 MG Tab, Take 1 Tab by mouth 2 times a day for 7 days., Disp: 14 Tab, Rfl: 0  •  fluticasone (FLONASE) 50 MCG/ACT nasal spray, Spray 1 Spray in nose every day., Disp: 16 g, Rfl: 0  •  escitalopram (LEXAPRO) 20 MG tablet, Take 1 Tab by mouth every day., Disp: 90 Tab, Rfl: 1  •  levothyroxine (SYNTHROID) 175 MCG Tab, Take 1 Tab by mouth Every morning on an empty stomach., Disp: 90 Tab, Rfl: 1    Current Facility-Administered Medications:   •  dexamethasone (DECADRON) injection (check route below) 10 mg, 10 mg, Oral, Once, Dylan Prabhakar P.A.-RANDOLPH.  ALLERGIES:   Allergies   Allergen Reactions   • Latex Rash   • Morphine Rash   • Pcn [Penicillins]      Just hives   • Sulfa Drugs Itching     Skin starts to slough of   • Sulfamethoxazole-Trimethoprim Rash     Bactrum      SURGHX:   Past Surgical History:   Procedure Laterality Date   • AMPUTATION Right     R digits    • THYROIDECTOMY TOTAL      2008   • WIDE EXCISION MELANOMA, LEG, W/POSS.STSG      2014     SOCHX:  reports that she has been smoking. She has a 6.50 pack-year smoking history. She has quit using smokeless tobacco. She reports that she does not drink alcohol or use drugs.  FH: Family history was reviewed, no pertinent findings to report   Objective:   /68 (BP Location: Left arm, Patient Position: Sitting)   Pulse 92   Temp 37.3 °C (99.2 °F)   Resp 16   Ht 1.651 m (5' 5\")   Wt 89.4 kg (197 lb)   SpO2 96%   BMI 32.78 kg/m²   Physical Exam  Vitals signs reviewed.   Constitutional:       General: She is not in acute distress.     Appearance: Normal appearance. She is well-developed. She is not toxic-appearing.   HENT:      Head: " Normocephalic and atraumatic.      Right Ear: Tympanic membrane, ear canal and external ear normal.      Left Ear: Tympanic membrane, ear canal and external ear normal.      Nose: Mucosal edema, congestion and rhinorrhea present. Rhinorrhea is purulent.      Right Sinus: No maxillary sinus tenderness or frontal sinus tenderness.      Left Sinus: No maxillary sinus tenderness or frontal sinus tenderness.      Mouth/Throat:      Lips: Pink.      Mouth: Mucous membranes are moist.      Pharynx: Oropharynx is clear. Uvula midline.   Eyes:      General: Lids are normal.      Conjunctiva/sclera: Conjunctivae normal.   Neck:      Musculoskeletal: Neck supple.   Cardiovascular:      Rate and Rhythm: Normal rate and regular rhythm.      Heart sounds: Normal heart sounds, S1 normal and S2 normal. No murmur. No friction rub. No gallop.    Pulmonary:      Effort: Pulmonary effort is normal. No respiratory distress.      Breath sounds: Normal breath sounds. No decreased breath sounds, wheezing, rhonchi or rales.   Musculoskeletal:      Comments: Normal range of motion. Exhibits no edema and no tenderness.    Skin:     General: Skin is warm and dry.      Capillary Refill: Capillary refill takes less than 2 seconds.   Neurological:      Mental Status: She is alert and oriented to person, place, and time.      Cranial Nerves: No cranial nerve deficit.      Sensory: No sensory deficit.   Psychiatric:         Speech: Speech normal.         Behavior: Behavior normal.         Thought Content: Thought content normal.         Judgment: Judgment normal.           Assessment/Plan:   1. Acute non-recurrent sinusitis, unspecified location  - doxycycline (VIBRAMYCIN) 100 MG Tab; Take 1 Tab by mouth 2 times a day for 7 days.  Dispense: 14 Tab; Refill: 0  - fluticasone (FLONASE) 50 MCG/ACT nasal spray; Spray 1 Spray in nose every day.  Dispense: 16 g; Refill: 0    2. Pharyngitis, unspecified etiology  - dexamethasone (DECADRON) injection (check  route below) 10 mg    Records and testing from 12/29/2019 reviewed.  Mono and strep testing negative.    Patient had partial but incomplete response to antibiotics.  She did respond well to Decadron.  No evidence of bacterial oropharyngeal infection at this time.  I suspect the patient's throat discomfort is secondary to postnasal drip.  History and physical exam consistent with sinusitis.  Patient reports prior reaction to penicillins.  She was started on a 7-day course of doxycycline as well as an intranasal steroid.  She requests treatment with Decadron for sore throat.  Risks, benefits, side effects of recurrent steroid use discussed at length with patient.  She verbalized good understanding of risks and consents to treatment.     If patient symptoms worsen, new symptoms develop, or symptoms fail to fully resolve I would like her to follow-up with her PCP or return to clinic for reevaluation.     Differential diagnosis, natural history, supportive care, and indications for immediate follow-up discussed.

## 2024-01-23 PROBLEM — M75.111 PARTIAL NONTRAUMATIC TEAR OF RIGHT ROTATOR CUFF: Status: ACTIVE | Noted: 2024-01-23
